# Patient Record
(demographics unavailable — no encounter records)

---

## 2024-11-18 NOTE — PROCEDURE
[FreeTextEntry1] : Patient informed that Rigid Proctoscopy involves the insertion of a slender tube, known as a proctoscope, into the rectum, advanced above the anal canal, to directly examine the rectal lining. Proctoscopy enables the assessment and detection of conditions such as inflammation, polyps, tumors or other sources of bleeding and symptoms which may be located in the lower portion of the rectum. Verbal /written consent obtained with patient prior to examination.  With patient positioned left lateral decubitus, a lubricated rigid proctoscope is inserted into the anorectum and advanced to a distance of approximately 15 cm. Visualization of the rectal mucosa performed with limitations due to pt discomfort and/or presence of stool in the rectal vault. Procedure completed without known complication.  Results and findings discussed with pt with further treatment recommendations as appropriate.

## 2024-11-18 NOTE — HISTORY OF PRESENT ILLNESS
[FreeTextEntry1] : Josefa Pacheco is a very pleasant 75-year-old female, referred by Dr. Murillo, with a newly diagnosed rectal mass.  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  This has resulted in regular colonoscopic treatment evaluation.  She reports her last colonoscopy was approximately 3 years ago.  With the exam performed November 15, 2024 a 2.5 cm low rectal mass is diagnosed and biopsied.  At the time of my meeting with Josefa today the biopsy report is still pending.  Josefa reports over the past few months a feeling of tenesmus, anorectal pelvic floor achy pain which she has needed Tylenol for management.  She also reports the appearance of bright red blood per rectum with bowel movements over these past several weeks/2 to 3 months.  She denies any unintended weight loss.  Recent blood work from September does not demonstrate anemia.

## 2024-11-18 NOTE — PHYSICAL EXAM
[FreeTextEntry1] : Anorectal physical examination includes visual, digital rectal exam, proctoscopic exam In the right lateral/right posterior quadrant, just at the top of the anal sphincter muscle complex there is a palpable 2 to 3 cm mass consistent with the colonoscopy findings.

## 2024-11-18 NOTE — ASSESSMENT
[FreeTextEntry1] : 75-year-old female in otherwise good health, with a newly diagnosed rectal mass.  Clinically the mass is consistent with a malignancy.  While we await the histology report from the colonoscopic biopsies, we will move forward with additional diagnostic evaluation and appropriate consultation.  I discussed with Josefa the need for CAT scan and MRI evaluation.  Additionally consultation with medical oncology and radiation oncology.  I reviewed with her the potential recommendation for a AQUILES (Total Neoadjuvant tTherapy) program, with the goals of cancer cure and anal sphincter muscle preservation.  With the mass currently located just above the anal sphincter muscle complex, sphincter preservation is anticipated but not yet guaranteed.  With everything that needs to be done, Josefa is grateful that we will enroll her in the Cancer Navigation program so that all of the logistics of ongoing evaluation and treatment are comanaged.

## 2024-11-18 NOTE — CONSULT LETTER
[Dear  ___] : Dear  [unfilled], [Consult Letter:] : I had the pleasure of evaluating your patient, [unfilled]. [FreeTextEntry2] : MD Kd Barnes MD [FreeTextEntry1] : It was a pleasure to meet Josefa Pacheco today, following colonoscopic diagnosis of a rectal mass suspicious for malignancy, last Friday, November 15.   We have initiated CAT scan and MRI evaluation with referrals for [facility] and RadOnc consultation.  I will stay in touch Again many thanks

## 2024-11-27 NOTE — REASON FOR VISIT
[Consideration of Curative Therapy] : consideration of curative therapy for [Rectal Cancer] : cancer [Initial Consultation] : an initial consultation [FreeTextEntry2] : rectal mass

## 2024-11-27 NOTE — HISTORY OF PRESENT ILLNESS
[FreeTextEntry1] : Ms. Pacheco is a 75-year-old female with a newly diagnosed rectal mass.  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 This has resulted in regular colonoscopic treatment evaluation. She reports her last colonoscopy was approximately 3 years ago. With the exam performed November 15, 2024 a 2.5 cm low rectal mass is diagnosed and biopsied.  Josefa reports over the past few months a feeling of tenesmus, anorectal pelvic floor achy pain which she has needed Tylenol for management. She also reports the appearance of bright red blood per rectum with bowel movements over these past several weeks/2 to 3 months. She denies any unintended weight loss. Recent blood work from September does not demonstrate anemia.  Pt will be seeing Dr. Plaza for rad onc consultation   11/15/24 Colonoscopy with Dr. Murillo.   Report: - Polyp (0.5 cm) in the cecum and appendiceal orifice. (Polypectomy).  - Soft tissue mass 2.5CM, friabile Just w/i the rectum in the distal rectum. (Biopsy).  - Grade/Stage II internal hemorrhoids.  - Normal mucosa in the ascending colon, transverse colon, descending colon, sigmoid colon and rectum compatible with microscopic colitis. (Biopsy)   Pathology:  FINAL DIAGNOSIS  A. CECUM, POLYP, BIOPSY  - Tubular adenoma  B. RANDOM ASCENDING COLON, BIOPSY  - Colonic mucosa with no significant pathologic abnormality  C. RANDOM TRANSVERSE COLON, BIOPSY  - Colonic mucosa with no significant pathologic abnormality  D. RANDOM DESCENDING COLON, BIOPSY  - Colonic mucosa with no significant pathologic abnormality  E. RANDOM SIGMOID COLON, BIOPSY  - Colonic mucosa with no pathologic abnormality  F. RECTUM, MASS, BIOPSY  - Rectal mucosa, partially denuded with ischemic changes, focal crypt rupture, and an increase in lamina propria fibrosis, suggestive of mucosal prolapse-type change  - Significant inflammation is not seen  - No dysplasia is seen  - Deeper levels were examined  - See comment    DIAGNOSIS COMMENT:  - No evidence of malignancy is seen in part F. Features suggestive of prolapse-type change/solitary rectal ulcer syndrome are seen. However, it cannot be entirely excluded that this biopsy is not entirely representative of the lesion or that a deeper submucosal lesion is present which is not captured in this biopsy. Clinical and colonoscopic correlation are recommended.  - No significant inflammation, dysplasia, or granulomas are seen in the random colon biopsies.  - This case was reviewed intradepartmentally with a gastrointestinal subspecialty pathologist with agreement.     Family cancer hx:  Family history of Alive and well : Son, Sister Family history of  : Mother, Father, Brother Family history of cardiac disorder (V17.49) (Z82.49) : Brother Family history of lung cancer (V16.1) (Z80.1) : Mother Family history of malignant neoplasm of colon (V16.0) (Z80.0) : Brother Family history of thyroid disease (V18.19) (Z83.49) : Sister Family history of Myocardial infarction (lateral wall) : Father Unknown family medical history : Multiple Family Members  Alcohol use (V49.89) (Z78.9) Exercise frequency (times/week) Home Never a smoker No illicit drug use Retired from employment  Current Meds Levothyroxine Sodium 75 MCG Oral Tablet; TAKE 1 TABLET BY MOUTH EVERY DAY Mercaptopurine 50 MG Oral Tablet; TAKE 2 TABLETS BY MOUTH EVERY DAY Mupirocin 2 % External Ointment Restasis 0.05 % Ophthalmic Emulsion; 1 drop both eyes Ophthalmic Twice a day Triamcinolone Acetonide 0.1 % External Cream  Health screenings: PAP smear Colonoscopy Mammogram Prostate    [0 - No Distress] : Distress Level: 0 [ECOG Performance Status: 0 - Fully active, able to carry on all pre-disease performance without restriction] : Performance Status: 0 - Fully active, able to carry on all pre-disease performance without restriction [de-identified] : Ms. Pacheco is a 75-year-old female with a newly diagnosed rectal mass.  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 This has resulted in regular colonoscopic treatment evaluation. She reports her last colonoscopy was approximately 3 years ago. With the exam performed November 15, 2024 a 2.5 cm low rectal mass is diagnosed and biopsied. Pt reports over the past few months a feeling of tenesmus, anorectal pelvic floor achy pain which she has needed Tylenol for management. She also reports the appearance of bright red blood per rectum with bowel movements over these past several weeks/2 to 3 months. She denies any unintended weight loss.  Pt had a repeat bx yesterday, and is reporting some scant BRBPR w BM this AM.  Pt will be seeing Dr. Plaza for rad onc consultation later today. Pt also has CT CAP scheduled for Monday.   11/15/24 Colonoscopy with Dr. Murillo.   Report: - Polyp (0.5 cm) in the cecum and appendiceal orifice. (Polypectomy).  - Soft tissue mass 2.5CM, friabile Just w/i the rectum in the distal rectum. (Biopsy).  - Grade/Stage II internal hemorrhoids.  - Normal mucosa in the ascending colon, transverse colon, descending colon, sigmoid colon and rectum compatible with microscopic colitis. (Biopsy)   Pathology:  F. RECTUM, MASS, BIOPSY  - Rectal mucosa, partially denuded with ischemic changes, focal crypt rupture, and an increase in lamina propria fibrosis, suggestive of mucosal prolapse-type change  - Significant inflammation is not seen  - No dysplasia is seen  - Deeper levels were examined       Family cancer hx:  Lung cancer- mother Colon cancer- Pt reports that son Galdino, as a peds ER MD in [place]., and would be the primary [person to make decisions if she loses capacity, but that she expects that her other son, Oliver would also be involved in shared decision-making.,   Social Hx: Alcohol use (V49.89) (Z78.9)- occasssional Never a smoker No illicit drug use Retired from employment as    Current Meds Levothyroxine Sodium 75 MCG Oral Tablet; TAKE 1 TABLET BY MOUTH EVERY DAY Mercaptopurine 50 MG Oral Tablet; TAKE 2 TABLETS BY MOUTH EVERY DAY Mupirocin 2 % External Ointment Restasis 0.05 % Ophthalmic Emulsion; 1 drop both eyes Ophthalmic Twice a day (when remembers) Triamcinolone Acetonide 0.1 % External Cream  Health screenings: PAP smear n/a (pt s/p KING/BSO) Mammogram

## 2024-11-27 NOTE — HISTORY OF PRESENT ILLNESS
[FreeTextEntry1] : Ms. Pacheco is a 75-year-old female with a newly diagnosed rectal mass.  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 This has resulted in regular colonoscopic treatment evaluation. She reports her last colonoscopy was approximately 3 years ago. With the exam performed November 15, 2024 a 2.5 cm low rectal mass is diagnosed and biopsied.  Josefa reports over the past few months a feeling of tenesmus, anorectal pelvic floor achy pain which she has needed Tylenol for management. She also reports the appearance of bright red blood per rectum with bowel movements over these past several weeks/2 to 3 months. She denies any unintended weight loss. Recent blood work from September does not demonstrate anemia.  Pt will be seeing Dr. Plaza for rad onc consultation   11/15/24 Colonoscopy with Dr. Murillo.   Report: - Polyp (0.5 cm) in the cecum and appendiceal orifice. (Polypectomy).  - Soft tissue mass 2.5CM, friabile Just w/i the rectum in the distal rectum. (Biopsy).  - Grade/Stage II internal hemorrhoids.  - Normal mucosa in the ascending colon, transverse colon, descending colon, sigmoid colon and rectum compatible with microscopic colitis. (Biopsy)   Pathology:  FINAL DIAGNOSIS  A. CECUM, POLYP, BIOPSY  - Tubular adenoma  B. RANDOM ASCENDING COLON, BIOPSY  - Colonic mucosa with no significant pathologic abnormality  C. RANDOM TRANSVERSE COLON, BIOPSY  - Colonic mucosa with no significant pathologic abnormality  D. RANDOM DESCENDING COLON, BIOPSY  - Colonic mucosa with no significant pathologic abnormality  E. RANDOM SIGMOID COLON, BIOPSY  - Colonic mucosa with no pathologic abnormality  F. RECTUM, MASS, BIOPSY  - Rectal mucosa, partially denuded with ischemic changes, focal crypt rupture, and an increase in lamina propria fibrosis, suggestive of mucosal prolapse-type change  - Significant inflammation is not seen  - No dysplasia is seen  - Deeper levels were examined  - See comment    DIAGNOSIS COMMENT:  - No evidence of malignancy is seen in part F. Features suggestive of prolapse-type change/solitary rectal ulcer syndrome are seen. However, it cannot be entirely excluded that this biopsy is not entirely representative of the lesion or that a deeper submucosal lesion is present which is not captured in this biopsy. Clinical and colonoscopic correlation are recommended.  - No significant inflammation, dysplasia, or granulomas are seen in the random colon biopsies.  - This case was reviewed intradepartmentally with a gastrointestinal subspecialty pathologist with agreement.     Family cancer hx:  Family history of Alive and well : Son, Sister Family history of  : Mother, Father, Brother Family history of cardiac disorder (V17.49) (Z82.49) : Brother Family history of lung cancer (V16.1) (Z80.1) : Mother Family history of malignant neoplasm of colon (V16.0) (Z80.0) : Brother Family history of thyroid disease (V18.19) (Z83.49) : Sister Family history of Myocardial infarction (lateral wall) : Father Unknown family medical history : Multiple Family Members  Alcohol use (V49.89) (Z78.9) Exercise frequency (times/week) Home Never a smoker No illicit drug use Retired from employment  Current Meds Levothyroxine Sodium 75 MCG Oral Tablet; TAKE 1 TABLET BY MOUTH EVERY DAY Mercaptopurine 50 MG Oral Tablet; TAKE 2 TABLETS BY MOUTH EVERY DAY Mupirocin 2 % External Ointment Restasis 0.05 % Ophthalmic Emulsion; 1 drop both eyes Ophthalmic Twice a day Triamcinolone Acetonide 0.1 % External Cream  Health screenings: PAP smear Colonoscopy Mammogram Prostate    [0 - No Distress] : Distress Level: 0 [ECOG Performance Status: 0 - Fully active, able to carry on all pre-disease performance without restriction] : Performance Status: 0 - Fully active, able to carry on all pre-disease performance without restriction [de-identified] : Ms. Pacheco is a 75-year-old female with a newly diagnosed rectal mass.  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 This has resulted in regular colonoscopic treatment evaluation. She reports her last colonoscopy was approximately 3 years ago. With the exam performed November 15, 2024 a 2.5 cm low rectal mass is diagnosed and biopsied. Pt reports over the past few months a feeling of tenesmus, anorectal pelvic floor achy pain which she has needed Tylenol for management. She also reports the appearance of bright red blood per rectum with bowel movements over these past several weeks/2 to 3 months. She denies any unintended weight loss.  Pt had a repeat bx yesterday, and is reporting some scant BRBPR w BM this AM.  Pt will be seeing Dr. Plaza for rad onc consultation later today. Pt also has CT CAP scheduled for Monday.   11/15/24 Colonoscopy with Dr. Murillo.   Report: - Polyp (0.5 cm) in the cecum and appendiceal orifice. (Polypectomy).  - Soft tissue mass 2.5CM, friabile Just w/i the rectum in the distal rectum. (Biopsy).  - Grade/Stage II internal hemorrhoids.  - Normal mucosa in the ascending colon, transverse colon, descending colon, sigmoid colon and rectum compatible with microscopic colitis. (Biopsy)   Pathology:  F. RECTUM, MASS, BIOPSY  - Rectal mucosa, partially denuded with ischemic changes, focal crypt rupture, and an increase in lamina propria fibrosis, suggestive of mucosal prolapse-type change  - Significant inflammation is not seen  - No dysplasia is seen  - Deeper levels were examined       Family cancer hx:  Lung cancer- mother Colon cancer- Pt reports that son Galdino, as a peds ER MD in [place]., and would be the primary [person to make decisions if she loses capacity, but that she expects that her other son, Oliver would also be involved in shared decision-making.,   Social Hx: Alcohol use (V49.89) (Z78.9)- occasssional Never a smoker No illicit drug use Retired from employment as    Current Meds Levothyroxine Sodium 75 MCG Oral Tablet; TAKE 1 TABLET BY MOUTH EVERY DAY Mercaptopurine 50 MG Oral Tablet; TAKE 2 TABLETS BY MOUTH EVERY DAY Mupirocin 2 % External Ointment Restasis 0.05 % Ophthalmic Emulsion; 1 drop both eyes Ophthalmic Twice a day (when remembers) Triamcinolone Acetonide 0.1 % External Cream  Health screenings: PAP smear n/a (pt s/p KING/BSO) Mammogram

## 2024-11-27 NOTE — ASSESSMENT
[FreeTextEntry1] : Discussed initial biopsy results which were negative However, given appearance of mass, clinical suspicion is almost certain it is a malignancy Pending repeat biopsy results from yesterday  s/p MRI pelvis 11/25/24 CT C/A/P scheduled for 12/2  Discussed about the role of local therapy including surgery, radiation and systemic therapy We also reviewed the indication for neoadjuvant approach and the meaning of AQUILES for rectal cancer management. This will largely depend on the imaging results.  Will discuss utility of [employer]- ctDNA which is a useful tool for monitoring at next visit   Plan >CBC, CMP, CEA, Ca 19-9 >will follow up MRI and CT results >follow with Rad Onc today >plan for TB discussion next week

## 2024-11-27 NOTE — VITALS
[90: Able to carry normal activity; minor signs or symptoms of disease.] : 90: Able to carry normal activity; minor signs or symptoms of disease.  [Maximal Pain Intensity: 3/10] : 3/10 [Pain Location: ___] : Pain Location: [unfilled] [OTC] : OTC [Date: ____________] : Patient's last distress assessment performed on [unfilled]. [4 - Distress Level] : Distress Level: 4

## 2024-12-03 NOTE — ADDENDUM
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 Plan to treat as per anal cancer. Staging evaluation revealed T3N0 disease. I discussed with the patient's care team and the patient. She will be scheduled for simulation and treated definitively with chemoradiation as per RTOG 0529. 10.9

## 2024-12-03 NOTE — HISTORY OF PRESENT ILLNESS
[FreeTextEntry1] : Ms. Pacheco is a 75-year-old female with new rectal cancer.  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 This has resulted in regular colonoscopic treatment evaluation. She reports her last colonoscopy was approximately 3 years ago.  Josefa reports over the past few months a feeling of tenesmus, anorectal pelvic floor achy pain which she has needed Tylenol for management. She also reports the appearance of bright red blood per rectum with bowel movements over these past several weeks/2 to 3 months. She denies any unintended weight loss. Recent blood work from September does not demonstrate anemia.  11/15/24 Colonoscopy with Dr. Murillo.   Report: - Polyp (0.5 cm) in the cecum and appendiceal orifice. (Polypectomy).  - Soft tissue mass 2.5CM, friabile Just w/i the rectum in the distal rectum. (Biopsy).  - Grade/Stage II internal hemorrhoids.  - Normal mucosa in the ascending colon, transverse colon, descending colon, sigmoid colon and rectum compatible with microscopic colitis. (Biopsy)   Pathology:  FINAL DIAGNOSIS  A. CECUM, POLYP, BIOPSY  - Tubular adenoma  B. RANDOM ASCENDING COLON, BIOPSY  - Colonic mucosa with no significant pathologic abnormality  C. RANDOM TRANSVERSE COLON, BIOPSY  - Colonic mucosa with no significant pathologic abnormality  D. RANDOM DESCENDING COLON, BIOPSY  - Colonic mucosa with no significant pathologic abnormality  E. RANDOM SIGMOID COLON, BIOPSY  - Colonic mucosa with no pathologic abnormality  F. RECTUM, MASS, BIOPSY  - Rectal mucosa, partially denuded with ischemic changes, focal crypt rupture, and an increase in lamina propria fibrosis, suggestive of mucosal prolapse-type change  - Significant inflammation is not seen  - No dysplasia is seen  - Deeper levels were examined  - See comment    DIAGNOSIS COMMENT:  - No evidence of malignancy is seen in part F. Features suggestive of prolapse-type change/solitary rectal ulcer syndrome are seen. However, it cannot be entirely excluded that this biopsy is not entirely representative of the lesion or that a deeper submucosal lesion is present which is not captured in this biopsy. Clinical and colonoscopic correlation are recommended.  - No significant inflammation, dysplasia, or granulomas are seen in the random colon biopsies.  - This case was reviewed intradepartmentally with a gastrointestinal subspecialty pathologist with agreement.  11/25/24 MRI Pelvis   11/26/24 repeat biopsy with Dr. Irving  11/27/24 Patient presents today for consultation for radiation.

## 2024-12-03 NOTE — PHYSICAL EXAM
[] : no respiratory distress [Exaggerated Use Of Accessory Muscles For Inspiration] : no accessory muscle use [Respiration, Rhythm And Depth] : normal respiratory rhythm and effort [Abdomen Soft] : soft [Nondistended] : nondistended [Abdomen Tenderness] : non-tender [Normal External Genitalia] : normal external genitalia  [Normal] : oriented to person, place and time, the affect was normal, the mood was normal and not anxious [FreeTextEntry1] : Exam performed with JOSE Espinal present as chaperone; there is a bulky but non-obstructing rectal mass located on the posterior rectal wall about 3cm from the anal verge, the proximal extent of the tumor could not be reached; small external hemorrhoid [de-identified] : there is a small mobile, shotty palpable right inguinal node

## 2024-12-03 NOTE — ADDENDUM
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 Plan to treat as per anal cancer. Staging evaluation revealed T3N0 disease. I discussed with the patient's care team and the patient. She will be scheduled for simulation and treated definitively with chemoradiation as per RTOG 0529.

## 2024-12-03 NOTE — DISEASE MANAGEMENT
[Clinical] : TNM Stage: c [N/A] : Currently not applicable [FreeTextEntry4] : TBD - undergoing staging evaluation [TTNM] : - [NTNM] : - [MTNM] : -

## 2024-12-03 NOTE — PHYSICAL EXAM
[] : no respiratory distress [Exaggerated Use Of Accessory Muscles For Inspiration] : no accessory muscle use [Respiration, Rhythm And Depth] : normal respiratory rhythm and effort [Abdomen Soft] : soft [Nondistended] : nondistended [Abdomen Tenderness] : non-tender [Normal External Genitalia] : normal external genitalia  [Normal] : oriented to person, place and time, the affect was normal, the mood was normal and not anxious [FreeTextEntry1] : Exam performed with JOSE Espinal present as chaperone; there is a bulky but non-obstructing rectal mass located on the posterior rectal wall about 3cm from the anal verge, the proximal extent of the tumor could not be reached; small external hemorrhoid [de-identified] : there is a small mobile, shotty palpable right inguinal node

## 2024-12-03 NOTE — PHYSICAL EXAM
[] : no respiratory distress [Respiration, Rhythm And Depth] : normal respiratory rhythm and effort [Exaggerated Use Of Accessory Muscles For Inspiration] : no accessory muscle use [Abdomen Soft] : soft [Nondistended] : nondistended [Abdomen Tenderness] : non-tender [Normal External Genitalia] : normal external genitalia  [Normal] : oriented to person, place and time, the affect was normal, the mood was normal and not anxious [FreeTextEntry1] : Exam performed with JOSE Espinal present as chaperone; there is a bulky but non-obstructing rectal mass located on the posterior rectal wall about 3cm from the anal verge, the proximal extent of the tumor could not be reached; small external hemorrhoid [de-identified] : there is a small mobile, shotty palpable right inguinal node

## 2024-12-05 NOTE — REVIEW OF SYSTEMS
Addressed at facility    Thanks!   [Diarrhea: Grade 0] : Diarrhea: Grade 0 [Negative] : Allergic/Immunologic

## 2024-12-11 NOTE — HISTORY OF PRESENT ILLNESS
[0 - No Distress] : Distress Level: 0 [ECOG Performance Status: 0 - Fully active, able to carry on all pre-disease performance without restriction] : Performance Status: 0 - Fully active, able to carry on all pre-disease performance without restriction [de-identified] : Ms. Pacheco is a 75-year-old female with a newly diagnosed rectal mass.  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 This has resulted in regular colonoscopic treatment evaluation. She reports her last colonoscopy was approximately 3 years ago. With the exam performed November 15, 2024 a 2.5 cm low rectal mass is diagnosed and biopsied. Pt reports over the past few months a feeling of tenesmus, anorectal pelvic floor achy pain which she has needed Tylenol for management. She also reports the appearance of bright red blood per rectum with bowel movements over these past several weeks/2 to 3 months. She denies any unintended weight loss.  Pt had a repeat bx yesterday, and is reporting some scant BRBPR w BM this AM.  Pt will be seeing Dr. Plaza for rad onc consultation later today. Pt also has CT CAP scheduled for Monday.   11/15/24 Colonoscopy with Dr. Murillo.   Report: - Polyp (0.5 cm) in the cecum and appendiceal orifice. (Polypectomy).  - Soft tissue mass 2.5CM, friabile Just w/i the rectum in the distal rectum. (Biopsy).  - Grade/Stage II internal hemorrhoids.  - Normal mucosa in the ascending colon, transverse colon, descending colon, sigmoid colon and rectum compatible with microscopic colitis. (Biopsy)   Pathology:  F. RECTUM, MASS, BIOPSY  - Rectal mucosa, partially denuded with ischemic changes, focal crypt rupture, and an increase in lamina propria fibrosis, suggestive of mucosal prolapse-type change  - Significant inflammation is not seen  - No dysplasia is seen  - Deeper levels were examined       Family cancer hx:  Lung cancer- mother Colon cancer- Pt reports that son Galdino, as a peds ER MD in [place]., and would be the primary [person to make decisions if she loses capacity, but that she expects that her other son, Oliver would also be involved in shared decision-making.,   Social Hx: Alcohol use (V49.89) (Z78.9)- occasssional Never a smoker No illicit drug use Retired from employment as    Current Meds Levothyroxine Sodium 75 MCG Oral Tablet; TAKE 1 TABLET BY MOUTH EVERY DAY Mercaptopurine 50 MG Oral Tablet; TAKE 2 TABLETS BY MOUTH EVERY DAY Mupirocin 2 % External Ointment Restasis 0.05 % Ophthalmic Emulsion; 1 drop both eyes Ophthalmic Twice a day (when remembers) Triamcinolone Acetonide 0.1 % External Cream  Health screenings: PAP smear n/a (pt s/p KING/BSO) Mammogram     [de-identified] : here for follow up s/p Simulation this morning. Plan to start RT 12/16 Has some rectal pain controlled with tylenol No other complaints.

## 2024-12-11 NOTE — REASON FOR VISIT
[Initial Consultation] : an initial consultation [Follow-Up Visit] : a follow-up [FreeTextEntry2] : rectal mass

## 2024-12-11 NOTE — ASSESSMENT
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 No EMVI. 3.9 cm CT C/A/P scheduled for 12/2- no metastatic disease. Small indeterminate liver lesion. will continue to monitor Case presented in TB Discussed utility of Signatera- ctDNA which is a useful tool for monitoring. Signed up for [employer]ater[employer] today   Reviewed treatment regimen with patient. Plan for Mitomycin/Xeloda Mitomycin 10 mg/m2 Day 1 and 29.  Xeloda daily with radiation: 825 mg/m2 BID on days of radiation  Plan >CBC, CMP, CEA, Ca 19-9 >signatera >plan to start treatment 12/16 >Xeloda 1300 mg BID sent to [facility]

## 2024-12-11 NOTE — HISTORY OF PRESENT ILLNESS
[0 - No Distress] : Distress Level: 0 [ECOG Performance Status: 0 - Fully active, able to carry on all pre-disease performance without restriction] : Performance Status: 0 - Fully active, able to carry on all pre-disease performance without restriction [de-identified] : Ms. Pacheco is a 75-year-old female with a newly diagnosed rectal mass.  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 This has resulted in regular colonoscopic treatment evaluation. She reports her last colonoscopy was approximately 3 years ago. With the exam performed November 15, 2024 a 2.5 cm low rectal mass is diagnosed and biopsied. Pt reports over the past few months a feeling of tenesmus, anorectal pelvic floor achy pain which she has needed Tylenol for management. She also reports the appearance of bright red blood per rectum with bowel movements over these past several weeks/2 to 3 months. She denies any unintended weight loss.  Pt had a repeat bx yesterday, and is reporting some scant BRBPR w BM this AM.  Pt will be seeing Dr. Plaza for rad onc consultation later today. Pt also has CT CAP scheduled for Monday.   11/15/24 Colonoscopy with Dr. Murillo.   Report: - Polyp (0.5 cm) in the cecum and appendiceal orifice. (Polypectomy).  - Soft tissue mass 2.5CM, friabile Just w/i the rectum in the distal rectum. (Biopsy).  - Grade/Stage II internal hemorrhoids.  - Normal mucosa in the ascending colon, transverse colon, descending colon, sigmoid colon and rectum compatible with microscopic colitis. (Biopsy)   Pathology:  F. RECTUM, MASS, BIOPSY  - Rectal mucosa, partially denuded with ischemic changes, focal crypt rupture, and an increase in lamina propria fibrosis, suggestive of mucosal prolapse-type change  - Significant inflammation is not seen  - No dysplasia is seen  - Deeper levels were examined       Family cancer hx:  Lung cancer- mother Colon cancer- Pt reports that son Galdino, as a peds ER MD in [place]., and would be the primary [person to make decisions if she loses capacity, but that she expects that her other son, Oliver would also be involved in shared decision-making.,   Social Hx: Alcohol use (V49.89) (Z78.9)- occasssional Never a smoker No illicit drug use Retired from employment as    Current Meds Levothyroxine Sodium 75 MCG Oral Tablet; TAKE 1 TABLET BY MOUTH EVERY DAY Mercaptopurine 50 MG Oral Tablet; TAKE 2 TABLETS BY MOUTH EVERY DAY Mupirocin 2 % External Ointment Restasis 0.05 % Ophthalmic Emulsion; 1 drop both eyes Ophthalmic Twice a day (when remembers) Triamcinolone Acetonide 0.1 % External Cream  Health screenings: PAP smear n/a (pt s/p KING/BSO) Mammogram     [de-identified] : here for follow up s/p Simulation this morning. Plan to start RT 12/16 Has some rectal pain controlled with tylenol No other complaints.

## 2024-12-11 NOTE — HISTORY OF PRESENT ILLNESS
[0 - No Distress] : Distress Level: 0 [ECOG Performance Status: 0 - Fully active, able to carry on all pre-disease performance without restriction] : Performance Status: 0 - Fully active, able to carry on all pre-disease performance without restriction [de-identified] : Ms. Pacheco is a 75-year-old female with a newly diagnosed rectal mass.  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 This has resulted in regular colonoscopic treatment evaluation. She reports her last colonoscopy was approximately 3 years ago. With the exam performed November 15, 2024 a 2.5 cm low rectal mass is diagnosed and biopsied. Pt reports over the past few months a feeling of tenesmus, anorectal pelvic floor achy pain which she has needed Tylenol for management. She also reports the appearance of bright red blood per rectum with bowel movements over these past several weeks/2 to 3 months. She denies any unintended weight loss.  Pt had a repeat bx yesterday, and is reporting some scant BRBPR w BM this AM.  Pt will be seeing Dr. Plaza for rad onc consultation later today. Pt also has CT CAP scheduled for Monday.   11/15/24 Colonoscopy with Dr. Murillo.   Report: - Polyp (0.5 cm) in the cecum and appendiceal orifice. (Polypectomy).  - Soft tissue mass 2.5CM, friabile Just w/i the rectum in the distal rectum. (Biopsy).  - Grade/Stage II internal hemorrhoids.  - Normal mucosa in the ascending colon, transverse colon, descending colon, sigmoid colon and rectum compatible with microscopic colitis. (Biopsy)   Pathology:  F. RECTUM, MASS, BIOPSY  - Rectal mucosa, partially denuded with ischemic changes, focal crypt rupture, and an increase in lamina propria fibrosis, suggestive of mucosal prolapse-type change  - Significant inflammation is not seen  - No dysplasia is seen  - Deeper levels were examined       Family cancer hx:  Lung cancer- mother Colon cancer- Pt reports that son Galdino, as a peds ER MD in [place]., and would be the primary [person to make decisions if she loses capacity, but that she expects that her other son, Oliver would also be involved in shared decision-making.,   Social Hx: Alcohol use (V49.89) (Z78.9)- occasssional Never a smoker No illicit drug use Retired from employment as    Current Meds Levothyroxine Sodium 75 MCG Oral Tablet; TAKE 1 TABLET BY MOUTH EVERY DAY Mercaptopurine 50 MG Oral Tablet; TAKE 2 TABLETS BY MOUTH EVERY DAY Mupirocin 2 % External Ointment Restasis 0.05 % Ophthalmic Emulsion; 1 drop both eyes Ophthalmic Twice a day (when remembers) Triamcinolone Acetonide 0.1 % External Cream  Health screenings: PAP smear n/a (pt s/p KING/BSO) Mammogram     [de-identified] : here for follow up s/p Simulation this morning. Plan to start RT 12/16 Has some rectal pain controlled with tylenol No other complaints.

## 2024-12-11 NOTE — HISTORY OF PRESENT ILLNESS
[0 - No Distress] : Distress Level: 0 [ECOG Performance Status: 0 - Fully active, able to carry on all pre-disease performance without restriction] : Performance Status: 0 - Fully active, able to carry on all pre-disease performance without restriction [de-identified] : Ms. Pacheco is a 75-year-old female with a newly diagnosed rectal mass.  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 This has resulted in regular colonoscopic treatment evaluation. She reports her last colonoscopy was approximately 3 years ago. With the exam performed November 15, 2024 a 2.5 cm low rectal mass is diagnosed and biopsied. Pt reports over the past few months a feeling of tenesmus, anorectal pelvic floor achy pain which she has needed Tylenol for management. She also reports the appearance of bright red blood per rectum with bowel movements over these past several weeks/2 to 3 months. She denies any unintended weight loss.  Pt had a repeat bx yesterday, and is reporting some scant BRBPR w BM this AM.  Pt will be seeing Dr. Plaza for rad onc consultation later today. Pt also has CT CAP scheduled for Monday.   11/15/24 Colonoscopy with Dr. Murillo.   Report: - Polyp (0.5 cm) in the cecum and appendiceal orifice. (Polypectomy).  - Soft tissue mass 2.5CM, friabile Just w/i the rectum in the distal rectum. (Biopsy).  - Grade/Stage II internal hemorrhoids.  - Normal mucosa in the ascending colon, transverse colon, descending colon, sigmoid colon and rectum compatible with microscopic colitis. (Biopsy)   Pathology:  F. RECTUM, MASS, BIOPSY  - Rectal mucosa, partially denuded with ischemic changes, focal crypt rupture, and an increase in lamina propria fibrosis, suggestive of mucosal prolapse-type change  - Significant inflammation is not seen  - No dysplasia is seen  - Deeper levels were examined       Family cancer hx:  Lung cancer- mother Colon cancer- Pt reports that son Galdino, as a peds ER MD in [place]., and would be the primary [person to make decisions if she loses capacity, but that she expects that her other son, Oliver would also be involved in shared decision-making.,   Social Hx: Alcohol use (V49.89) (Z78.9)- occasssional Never a smoker No illicit drug use Retired from employment as    Current Meds Levothyroxine Sodium 75 MCG Oral Tablet; TAKE 1 TABLET BY MOUTH EVERY DAY Mercaptopurine 50 MG Oral Tablet; TAKE 2 TABLETS BY MOUTH EVERY DAY Mupirocin 2 % External Ointment Restasis 0.05 % Ophthalmic Emulsion; 1 drop both eyes Ophthalmic Twice a day (when remembers) Triamcinolone Acetonide 0.1 % External Cream  Health screenings: PAP smear n/a (pt s/p KING/BSO) Mammogram     [de-identified] : here for follow up s/p Simulation this morning. Plan to start RT 12/16 Has some rectal pain controlled with tylenol No other complaints.

## 2024-12-13 NOTE — REVIEW OF SYSTEMS
Date of Service:  6/29/2023    Chief Complaint:  BPH follow-up    Independent Historian:  No    History of Present Illness:    Mark Jenkins is a 79 year old male presenting for transfer of care from Dr. Wolfe.  Patient has history of partial nephrectomy in 2016 for multi-cystic dysplastic right kidney.  Last CT September 19, 2022 with 2 mm right nonobstructing stone and well-healed right kidney.  Patient takes Flomax and finasteride for baseline nocturia, frequency and urgency.  He is noticing some increased frequency and weakening at the end of his stream.  Symptoms are worse with coffee.  Last PSA 0.02 on June 5, 2023.  PVR 33 cc, UA with trace blood     PSA Test Results:  Lab Results   Component Value Date    PSA 0.02 06/05/2023    PSA 0.02 10/25/2022    PSA 0.10 10/18/2021    PSA 0.12 09/24/2020    PSA 0.49 10/11/2019    PSA 0.17 04/19/2018    PSA 0.62 10/05/2017    PSA 0.17 11/29/2016    PSA 0.23 10/24/2016    PSA 0.19 04/28/2016       Past Medical History:   Past Medical History:   Diagnosis Date   • Adenoma of right adrenal gland 2018   • Allergy 1995   • Arthritis    • Bronchial asthma    • Bronchitis    • Cataract 2015    Removed   • Chronic obstructive pulmonary disease (CMD) 07/10/2014   • Chronic pain    • Diverticulitis    • Fracture    • Gynecomastia, male     Bilateral   • Hemorrhoids    • HTN (hypertension)    • Hyperlipidemia    • Macular degeneration, bilateral    • Prostatic hypertrophy    • S/p robotic right thoracoscopy (VATS) lower lobe basilar segmentectomy with lymph node dissection 5/10/2023   • Transfusion history     With THR  (own blood)   • Vertigo    • Wears glasses    • Wears partial dentures     upper removable bridge       Surgical History:  Past Surgical History:   Procedure Laterality Date   • Appendectomy  age 25   • Back surgery  1998    Crushed disc   • Breast surgery Bilateral 2014    At [facility]   Reduction   • Carpal tunnel release Right 05/20/2008    Carpal Tunnel   •  Colonoscopy w/ biopsies and polypectomy  2010   • Cyst removal Right     large kidney cyst removed   • Eye surgery Bilateral     Cataract Extraction with IOL   • Fracture surgery      pt denies   • Hernia repair  as infant    as b   • Joint replacement Left     THR   • Lung removal, partial Right 05/10/2023    S/p robotic right thoracoscopy (VATS) lower lobe basilar segmentectomy with lymph node dissection   • Sacral nerve stimulator placement     • Xray mammogram diagnostic bilat  09/15/2011   :    Family History:  Family History   Problem Relation Age of Onset   • Hypertension Mother    • Heart disease Mother    • Stroke Father    • Cancer Father 59        lung cancer and diagose stomach cancer at 62   • Hypertension Brother    • Heart disease Brother    • Early death Brother    • Coronary Artery Disease Brother    • Osteoarthritis Brother    • Diabetes Neg Hx        Social History:  Social History     Socioeconomic History   • Marital status: /Civil Union     Spouse name: Not on file   • Number of children: Not on file   • Years of education: Not on file   • Highest education level: Not on file   Occupational History   • Occupation: retired office work   Tobacco Use   • Smoking status: Former     Current packs/day: 0.00     Average packs/day: 0.5 packs/day for 32.1 years (16.1 pk-yrs)     Types: Cigarettes     Start date: 1960     Quit date: 1992     Years since quittin.7   • Smokeless tobacco: Never   Vaping Use   • Vaping Use: never used   Substance and Sexual Activity   • Alcohol use: No     Alcohol/week: 0.0 standard drinks of alcohol     Comment: rare; 1 in last 4 weeks;    • Drug use: No   • Sexual activity: Not Currently     Partners: Female     Birth control/protection: Post-menopausal   Other Topics Concern   •  Service Not Asked   • Blood Transfusions Not Asked   • Caffeine Concern Not Asked   • Occupational Exposure Not Asked   • Hobby Hazards Not Asked   •  Sleep Concern Not Asked   • Stress Concern Not Asked   • Weight Concern Not Asked   • Special Diet Not Asked   • Back Care Not Asked   • Exercise Not Asked   • Bike Helmet Not Asked   • Seat Belt Yes   • Self-Exams Not Asked   Social History Narrative    Mark is retired and  and lives with wife. 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 Patient states that he came up from [place] to [place] ([place] for 12 years) and [facility] (13 years) and had previouly lived in [place] (15 years). No pets at home     Social Determinants of Health     Financial Resource Strain: Low Risk  (5/10/2023)    Financial Resource Strain    • Social Determinants: Financial Resource Strain: None   Food Insecurity: No Food Insecurity (5/10/2023)    Food Insecurity    • Social Determinants: Food Insecurity: Never   Transportation Needs: No Transportation Needs (5/10/2023)    PRAPARE - Transportation    • Lack of Transportation (Medical): No    • Lack of Transportation (Non-Medical): No   Physical Activity: Not on file   Stress: Not on file   Social Connections: Socially Integrated (5/10/2023)    Social Connections    • Social Determinants: Social Connections: 5 or more times a week   Intimate Partner Violence: Not At Risk (5/10/2023)    Intimate Partner Violence    • Social Determinants: Intimate Partner Violence Past Fear: No    • Social Determinants: Intimate Partner Violence Current Fear: No       Allergies:  ALLERGIES:   Allergen Reactions   • Penicillins ANAPHYLAXIS   • Morphine RASH and PRURITUS   • Bee Sting HIVES     Ant bite is the correct agent that causes itching and trouble breathing       Medications:  Current Outpatient Medications   Medication Sig Dispense Refill   • tadalafil (CIALIS) 5 MG tablet Take 1 tablet by mouth daily. 30 tablet 3   • tamsulosin (FLOMAX) 0.4 MG Cap Take 1 capsule by mouth daily after a meal. 90 capsule 3   • carbamide peroxide (DEBROX) 6.5 % otic solution Place 5 drops into  right ear in the morning and 5 drops in the evening. 15 mL 0   • acetaminophen (TYLENOL) 500 MG tablet Take 500-1,000 mg by mouth every 6 hours as needed for Fever or Pain (mild to moderate pain). Indications: Fever, Pain, mild to moderate pain     • docusate sodium-sennosides (SENOKOT S) 50-8.6 MG per tablet Take 1 tablet by mouth 2 times daily as needed for Constipation. 15 tablet 0   • GuaiFENesin 1200 MG 12 hr tablet Take 1 tablet by mouth 2 times daily as needed for Congestion. 30 tablet 0   • tiZANidine (ZANAFLEX) 2 MG tablet Take 1 tablet by mouth every 8 hours as needed for Muscle spasms. 20 tablet 0   • losartan (COZAAR) 100 MG tablet TAKE 1 TABLET BY MOUTH EVERY DAY 90 tablet 1   • mupirocin (BACTROBAN) 2 % ointment Apply a thin layer to sores on scalp and forehead twice daily for 1 week 22 g 2   • aspirin 81 MG chewable tablet Chew 1 tablet by mouth daily.     • ketoconazole (NIZORAL) 2 % shampoo WASH FACE AND TRUNK 3 TIMES WEEKLY 120 mL 11   • ketoconazole (NIZORAL) 2 % cream Apply a thin layer to rash on the face once daily as needed 30 g 11   • hydroCORTisone (CORTIZONE) 2.5 % cream Apply 1 application. topically 2 times daily. (Patient taking differently: Apply 1 application. topically 2 times daily as needed for Itching or Rash.) 28 g 3   • ALPRAZolam (XANAX) 0.5 MG tablet Take 1 hour before mri/pet scan, do not drive. 3 tablet 0   • finasteride (PROSCAR) 5 MG tablet TAKE 1 TABLET BY MOUTH EVERY DAY 90 tablet 3   • atorvastatin (LIPITOR) 10 MG tablet TAKE 1 TABLET BY MOUTH EVERY DAY 90 tablet 1   • hydroCHLOROthiazide (HYDRODIURIL) 12.5 MG tablet TAKE 1 TABLET BY MOUTH EVERY DAY 90 tablet 1   • meclizine (ANTIVERT) 25 MG tablet Take 1 tablet by mouth 3 times daily as needed for Dizziness. 30 tablet 1   • vitamin B-12 (CYANOCOBALAMIN) 1000 MCG tablet Take 1,000 mcg by mouth daily.     • EPINEPHrine 0.3 MG/0.3ML auto-injector INJECT 0.3 MLS INTO THE MUSCLE AS NEEDED (HIVES). 2 each 3   • Polyethylene  Glycol Powder Take 17 g by mouth daily. 527 g 5   • clindamycin (CLEOCIN) 300 MG capsule Take 3 tablets 1 hour before dental work 9 capsule 1   • Multiple Vitamins-Minerals (ICAPS AREDS 2) Cap Take 2 tablets by mouth daily.     • Omega-3 Fatty Acids (FISH OIL) 1200 MG capsule Take 1,200 mg by mouth daily.     • albuterol-ipratropium 2.5 mg/0.5 mg (DUONEB) 0.5-2.5 (3) MG/3ML nebulizer solution Take 3 mLs by nebulization every 6 hours as needed for Wheezing. 120 mL 1   • saccharomyces boulardii (FLORASTOR) 250 MG capsule Take 250 mg by mouth as needed.     • cholecalciferol (VITAMIN D3) 1000 UNITS tablet Take 5,000 Units by mouth daily.       No current facility-administered medications for this visit.     Facility-Administered Medications Ordered in Other Visits   Medication   • sodium chloride 0.9 % flush bag 25 mL   • sodium chloride (PF) 0.9 % injection 2 mL       Allergies, Medications, Past Medical History, Past Surgical History, Family History, and Social History were reviewed today and changes are as noted above.    Physical Exam:  Constitutional:  Visit Vitals  /76   Pulse 64   Resp 16   Ht 6' (1.829 m)   Wt 79.4 kg (175 lb 0.7 oz)   BMI 23.74 kg/m²     Well developed, well nourished, and afebrile.    Eyes:  Conjunctivae and lids are normal.  Pupils and irises are equal and reactive to light.    Gastrointestinal:  Abdomen soft, nontender, nondistended.  No hepatosplenomegaly.  No hernias.    Skin:  Warm and dry with no lesions or induration.      Record Review:    I met with patient, Mark Jenkins, and obtained pertinent history and exam.    Tests Reviewed:  PVR, Urinalysis and PSA  Tests Ordered:  PVR and Urinalysis    The records were reviewed, as above.     Assessment and Plan:    Mark Jenkins is a 79 year old male with history of right partial nephrectomy for multicystic dysplastic kidney, on Flomax and finasteride for obstructive voiding symptoms, with worsening frequency and weakening  terminal stream since follow-up.  Question of microscopic hematuria    1. Will send urine for micro (at the time of note completion, repeat micro is negative)  2. Will transition from Flomax 0.8 mg to 0.4 mg with addition of daily 5 mg Cialis.  Follow-up in 1 month to reassess symptoms  3. Due for renal imaging around February, 2024, appropriate for ultrasound, but CT can also be considered    MDM/Complexity Statement:  2 stable chronic illnesses         [Diarrhea: Grade 0] : Diarrhea: Grade 0 [Negative] : Allergic/Immunologic

## 2024-12-23 NOTE — HISTORY OF PRESENT ILLNESS
[FreeTextEntry1] : 12/23/24 FX 1 Pt did very well with TX.  Today she denies any pain , N/V diarrhea or constipation symptoms.

## 2024-12-23 NOTE — DISEASE MANAGEMENT
[Clinical] : TNM Stage: c [N/A] : Currently not applicable [FreeTextEntry4] : TBD - undergoing staging evaluation [TTNM] : - [NTNM] : - [MTNM] : - [de-identified] : 180 [de-identified] : [id number] [de-identified] : anus/pelvis

## 2024-12-23 NOTE — REVIEW OF SYSTEMS
[Constipation: Grade 0] : Constipation: Grade 0 [Diarrhea: Grade 0] : Diarrhea: Grade 0 [Fecal Incontinence: Grade 0] : Fecal Incontinence: Grade 0 [Nausea: Grade 0] : Nausea: Grade 0 [Vomiting: Grade 0] : Vomiting: Grade 0 [Fatigue: Grade 1 - Fatigue relieved by rest] : Fatigue: Grade 1 - Fatigue relieved by rest [Vaginal Stricture: Grade 0] : Vaginal Stricture: Grade 0 [Dermatitis Radiation: Grade 0] : Dermatitis Radiation: Grade 0

## 2024-12-23 NOTE — HISTORY OF PRESENT ILLNESS
[0 - No Distress] : Distress Level: 0 [ECOG Performance Status: 0 - Fully active, able to carry on all pre-disease performance without restriction] : Performance Status: 0 - Fully active, able to carry on all pre-disease performance without restriction [de-identified] : Ms. Pacheco is a 75-year-old female with a newly diagnosed rectal mass.  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 This has resulted in regular colonoscopic treatment evaluation. She reports her last colonoscopy was approximately 3 years ago. With the exam performed November 15, 2024 a 2.5 cm low rectal mass is diagnosed and biopsied. Pt reports over the past few months a feeling of tenesmus, anorectal pelvic floor achy pain which she has needed Tylenol for management. She also reports the appearance of bright red blood per rectum with bowel movements over these past several weeks/2 to 3 months. She denies any unintended weight loss.  Pt had a repeat bx yesterday, and is reporting some scant BRBPR w BM this AM.  Pt will be seeing Dr. Plaza for rad onc consultation later today. Pt also has CT CAP scheduled for Monday.   11/15/24 Colonoscopy with Dr. Murillo.   Report: - Polyp (0.5 cm) in the cecum and appendiceal orifice. (Polypectomy).  - Soft tissue mass 2.5CM, friabile Just w/i the rectum in the distal rectum. (Biopsy).  - Grade/Stage II internal hemorrhoids.  - Normal mucosa in the ascending colon, transverse colon, descending colon, sigmoid colon and rectum compatible with microscopic colitis. (Biopsy)   Pathology:  F. RECTUM, MASS, BIOPSY  - Rectal mucosa, partially denuded with ischemic changes, focal crypt rupture, and an increase in lamina propria fibrosis, suggestive of mucosal prolapse-type change  - Significant inflammation is not seen  - No dysplasia is seen  - Deeper levels were examined       Family cancer hx:  Lung cancer- mother Colon cancer- Pt reports that son Galdino, as a peds ER MD in [place]., and would be the primary [person to make decisions if she loses capacity, but that she expects that her other son, Oliver would also be involved in shared decision-making.,   Social Hx: Alcohol use (V49.89) (Z78.9)- occasssional Never a smoker No illicit drug use Retired from employment as    Current Meds Levothyroxine Sodium 75 MCG Oral Tablet; TAKE 1 TABLET BY MOUTH EVERY DAY Mercaptopurine 50 MG Oral Tablet; TAKE 2 TABLETS BY MOUTH EVERY DAY Mupirocin 2 % External Ointment Restasis 0.05 % Ophthalmic Emulsion; 1 drop both eyes Ophthalmic Twice a day (when remembers) Triamcinolone Acetonide 0.1 % External Cream  Health screenings: PAP smear n/a (pt s/p KING/BSO) Mammogram     [de-identified] : here for follow up s/p Simulation this morning. Plan to start RT 12/16 Has some rectal pain controlled with tylenol No other complaints.

## 2024-12-23 NOTE — HISTORY OF PRESENT ILLNESS
[0 - No Distress] : Distress Level: 0 [ECOG Performance Status: 0 - Fully active, able to carry on all pre-disease performance without restriction] : Performance Status: 0 - Fully active, able to carry on all pre-disease performance without restriction [de-identified] : Ms. Pacheco is a 75-year-old female with a newly diagnosed rectal mass.  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 This has resulted in regular colonoscopic treatment evaluation. She reports her last colonoscopy was approximately 3 years ago. With the exam performed November 15, 2024 a 2.5 cm low rectal mass is diagnosed and biopsied. Pt reports over the past few months a feeling of tenesmus, anorectal pelvic floor achy pain which she has needed Tylenol for management. She also reports the appearance of bright red blood per rectum with bowel movements over these past several weeks/2 to 3 months. She denies any unintended weight loss.  Pt had a repeat bx yesterday, and is reporting some scant BRBPR w BM this AM.  Pt will be seeing Dr. Plaza for rad onc consultation later today. Pt also has CT CAP scheduled for Monday.   11/15/24 Colonoscopy with Dr. Murillo.   Report: - Polyp (0.5 cm) in the cecum and appendiceal orifice. (Polypectomy).  - Soft tissue mass 2.5CM, friabile Just w/i the rectum in the distal rectum. (Biopsy).  - Grade/Stage II internal hemorrhoids.  - Normal mucosa in the ascending colon, transverse colon, descending colon, sigmoid colon and rectum compatible with microscopic colitis. (Biopsy)   Pathology:  F. RECTUM, MASS, BIOPSY  - Rectal mucosa, partially denuded with ischemic changes, focal crypt rupture, and an increase in lamina propria fibrosis, suggestive of mucosal prolapse-type change  - Significant inflammation is not seen  - No dysplasia is seen  - Deeper levels were examined       Family cancer hx:  Lung cancer- mother Colon cancer- Pt reports that son Galdino, as a peds ER MD in [place]., and would be the primary [person to make decisions if she loses capacity, but that she expects that her other son, Oliver would also be involved in shared decision-making.,   Social Hx: Alcohol use (V49.89) (Z78.9)- occasssional Never a smoker No illicit drug use Retired from employment as    Current Meds Levothyroxine Sodium 75 MCG Oral Tablet; TAKE 1 TABLET BY MOUTH EVERY DAY Mercaptopurine 50 MG Oral Tablet; TAKE 2 TABLETS BY MOUTH EVERY DAY Mupirocin 2 % External Ointment Restasis 0.05 % Ophthalmic Emulsion; 1 drop both eyes Ophthalmic Twice a day (when remembers) Triamcinolone Acetonide 0.1 % External Cream  Health screenings: PAP smear n/a (pt s/p KING/BSO) Mammogram     [de-identified] : here for follow up s/p Simulation this morning. Plan to start RT 12/16 Has some rectal pain controlled with tylenol No other complaints.

## 2024-12-23 NOTE — PHYSICAL EXAM
[] : no respiratory distress [Exaggerated Use Of Accessory Muscles For Inspiration] : no accessory muscle use [Normal] : oriented to person, place and time, the affect was normal, the mood was normal and not anxious [FreeTextEntry1] : Deferred as this is only Fx 1

## 2024-12-23 NOTE — HISTORY OF PRESENT ILLNESS
[0 - No Distress] : Distress Level: 0 [ECOG Performance Status: 0 - Fully active, able to carry on all pre-disease performance without restriction] : Performance Status: 0 - Fully active, able to carry on all pre-disease performance without restriction [de-identified] : Ms. Pacheco is a 75-year-old female with a newly diagnosed rectal mass.  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 This has resulted in regular colonoscopic treatment evaluation. She reports her last colonoscopy was approximately 3 years ago. With the exam performed November 15, 2024 a 2.5 cm low rectal mass is diagnosed and biopsied. Pt reports over the past few months a feeling of tenesmus, anorectal pelvic floor achy pain which she has needed Tylenol for management. She also reports the appearance of bright red blood per rectum with bowel movements over these past several weeks/2 to 3 months. She denies any unintended weight loss.  Pt had a repeat bx yesterday, and is reporting some scant BRBPR w BM this AM.  Pt will be seeing Dr. Plaza for rad onc consultation later today. Pt also has CT CAP scheduled for Monday.   11/15/24 Colonoscopy with Dr. Murillo.   Report: - Polyp (0.5 cm) in the cecum and appendiceal orifice. (Polypectomy).  - Soft tissue mass 2.5CM, friabile Just w/i the rectum in the distal rectum. (Biopsy).  - Grade/Stage II internal hemorrhoids.  - Normal mucosa in the ascending colon, transverse colon, descending colon, sigmoid colon and rectum compatible with microscopic colitis. (Biopsy)   Pathology:  F. RECTUM, MASS, BIOPSY  - Rectal mucosa, partially denuded with ischemic changes, focal crypt rupture, and an increase in lamina propria fibrosis, suggestive of mucosal prolapse-type change  - Significant inflammation is not seen  - No dysplasia is seen  - Deeper levels were examined       Family cancer hx:  Lung cancer- mother Colon cancer- Pt reports that son Galdino, as a peds ER MD in [place]., and would be the primary [person to make decisions if she loses capacity, but that she expects that her other son, Oliver would also be involved in shared decision-making.,   Social Hx: Alcohol use (V49.89) (Z78.9)- occasssional Never a smoker No illicit drug use Retired from employment as    Current Meds Levothyroxine Sodium 75 MCG Oral Tablet; TAKE 1 TABLET BY MOUTH EVERY DAY Mercaptopurine 50 MG Oral Tablet; TAKE 2 TABLETS BY MOUTH EVERY DAY Mupirocin 2 % External Ointment Restasis 0.05 % Ophthalmic Emulsion; 1 drop both eyes Ophthalmic Twice a day (when remembers) Triamcinolone Acetonide 0.1 % External Cream  Health screenings: PAP smear n/a (pt s/p KING/BSO) Mammogram     [de-identified] : here for follow up s/p Simulation this morning. Plan to start RT 12/16 Has some rectal pain controlled with tylenol No other complaints.

## 2025-01-03 NOTE — DISEASE MANAGEMENT
[Clinical] : TNM Stage: c [N/A] : Currently not applicable [FreeTextEntry4] : TBD - undergoing staging evaluation [TTNM] : - [NTNM] : - [MTNM] : - [de-identified] : 1080 [de-identified] : 5902 [de-identified] : anus/pelvis

## 2025-01-03 NOTE — HISTORY OF PRESENT ILLNESS
[FreeTextEntry1] : 12/23/24 FX 1 Pt did very well with TX.  Today she denies any pain , N/V diarrhea or constipation symptoms.   12/31/24 FX 6.  Had some constipation this week end but it resolved with MiraLAX.  She is doing well now and denies any pain or discomfort passing stool or urine.  Her appetite has been good

## 2025-01-03 NOTE — REVIEW OF SYSTEMS
[Constipation: Grade 1 - Occasional or intermittent symptoms; occasional use of stool softeners, laxatives, dietary modification, or enema] : Constipation: Grade 1 - Occasional or intermittent symptoms; occasional use of stool softeners, laxatives, dietary modification, or enema [Diarrhea: Grade 0] : Diarrhea: Grade 0 [Nausea: Grade 0] : Nausea: Grade 0 [Rectal Pain: Grade 0] : Rectal Pain: Grade 0 [Fatigue: Grade 0] : Fatigue: Grade 0 [Urinary Tract Pain: Grade 0] : Urinary Tract Pain: Grade 0 [Urinary Urgency: Grade 0] : Urinary Urgency: Grade 0 [Urinary Frequency: Grade 0] : Urinary Frequency: Grade 0 [Skin Hyperpigmentation: Grade 0] : Skin Hyperpigmentation: Grade 0 [Dermatitis Radiation: Grade 0] : Dermatitis Radiation: Grade 0

## 2025-01-03 NOTE — DISEASE MANAGEMENT
[Clinical] : TNM Stage: c [N/A] : Currently not applicable [FreeTextEntry4] : TBD - undergoing staging evaluation [TTNM] : - [NTNM] : - [MTNM] : - [de-identified] : 1080 [de-identified] : 9692 [de-identified] : anus/pelvis

## 2025-01-03 NOTE — PHYSICAL EXAM
[] : no respiratory distress [Exaggerated Use Of Accessory Muscles For Inspiration] : no accessory muscle use [Abdomen Soft] : soft [Nondistended] : nondistended [Abdomen Tenderness] : non-tender [Normal] : oriented to person, place and time, the affect was normal, the mood was normal and not anxious [FreeTextEntry1] : Exam performed with LIEN Soto as chaperone; no perianal skin changes

## 2025-01-07 NOTE — VITALS
[Maximal Pain Intensity: 5/10] : 5/10 [Least Pain Intensity: 0/10] : 0/10 [NSAID/Non-Opioid] : NSAID/Non-Opioid [80: Normal activity with effort; some signs or symptoms of disease.] : 80: Normal activity with effort; some signs or symptoms of disease.

## 2025-01-08 NOTE — HISTORY OF PRESENT ILLNESS
[FreeTextEntry1] : 12/23/24 FX 1 Pt did very well with TX.  Today she denies any pain , N/V diarrhea or constipation symptoms.   12/31/24 FX 6.  Had some constipation this week end but it resolved with MiraLAX.  She is doing well now and denies any pain or discomfort passing stool or urine.  Her appetite has been good  1/7/24 FX 10. Doing well with TX  but is starting to have some loose stool 3-4 times per day.  She reports that she is also having discomfort in the groin area and is taking Tylenol for this.  Her appetite reamins good and her weight stable

## 2025-01-08 NOTE — REVIEW OF SYSTEMS
[Anal Pain: Grade 1 - Mild pain] : Anal Pain: Grade 1 - Mild pain [Diarrhea: Grade 1 - Increase of <4 stools per day over baseline; mild increase in ostomy output compared to baseline] : Diarrhea: Grade 1 - Increase of <4 stools per day over baseline; mild increase in ostomy output compared to baseline [Fecal Incontinence: Grade 0] : Fecal Incontinence: Grade 0 [Nausea: Grade 0] : Nausea: Grade 0 [Proctitis: Grade 0] : Proctitis: Grade 0 [Fatigue: Grade 1 - Fatigue relieved by rest] : Fatigue: Grade 1 - Fatigue relieved by rest [Urinary Tract Pain: Grade 0] : Urinary Tract Pain: Grade 0 [Urinary Urgency: Grade 0] : Urinary Urgency: Grade 0 [Urinary Frequency: Grade 0] : Urinary Frequency: Grade 0 [Dermatitis Radiation: Grade 0] : Dermatitis Radiation: Grade 0

## 2025-01-08 NOTE — DISEASE MANAGEMENT
[Clinical] : TNM Stage: c [N/A] : Currently not applicable [FreeTextEntry4] : TBD - undergoing staging evaluation [TTNM] : - [NTNM] : - [MTNM] : - [de-identified] : 4888 [de-identified] : 5439 [de-identified] : anus/pelvis

## 2025-01-08 NOTE — PHYSICAL EXAM
[Outer Ear] : the ears and nose were normal in appearance [] : no respiratory distress [Exaggerated Use Of Accessory Muscles For Inspiration] : no accessory muscle use [Abdomen Soft] : soft [Nondistended] : nondistended [Abdomen Tenderness] : non-tender [Normal External Genitalia] : normal external genitalia  [Normal] : oriented to person, place and time, the affect was normal, the mood was normal and not anxious [FreeTextEntry1] : Exam performed with LIEN Soto as chaperone; no perianal skin changes or desquamation

## 2025-01-08 NOTE — DISEASE MANAGEMENT
[Clinical] : TNM Stage: c [N/A] : Currently not applicable [FreeTextEntry4] : TBD - undergoing staging evaluation [TTNM] : - [NTNM] : - [MTNM] : - [de-identified] : 5101 [de-identified] : 5283 [de-identified] : anus/pelvis

## 2025-01-14 NOTE — ASSESSMENT
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 No EMVI. 3.9 cm CT C/A/P scheduled for 12/2- no metastatic disease. Small indeterminate liver lesion. will continue to monitor Case presented in TB Discussed utility of [employer]- ctDNA which is a useful tool for monitoring. Signed up for [employer] today   Reviewed treatment regimen with patient. Plan for Mitomycin/Xeloda Mitomycin 10 mg/m2 Day 1 and 29.  Xeloda daily with radiation: 825 mg/m2 BID on days of radiation  Plan >CBC, CMP, CEA, Ca 19-9 >signatera >started on 12/16. Now Day 14/30 RT. >c/w Xeloda 1300 mg BID on days of RT >plan for Mitomycin Day 29 on 1/21

## 2025-01-14 NOTE — HISTORY OF PRESENT ILLNESS
[0 - No Distress] : Distress Level: 0 [ECOG Performance Status: 0 - Fully active, able to carry on all pre-disease performance without restriction] : Performance Status: 0 - Fully active, able to carry on all pre-disease performance without restriction [de-identified] : Ms. Pacheco is a 75-year-old female with a newly diagnosed rectal mass.  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 This has resulted in regular colonoscopic treatment evaluation. She reports her last colonoscopy was approximately 3 years ago. With the exam performed November 15, 2024 a 2.5 cm low rectal mass is diagnosed and biopsied. Pt reports over the past few months a feeling of tenesmus, anorectal pelvic floor achy pain which she has needed Tylenol for management. She also reports the appearance of bright red blood per rectum with bowel movements over these past several weeks/2 to 3 months. She denies any unintended weight loss.  Pt had a repeat bx yesterday, and is reporting some scant BRBPR w BM this AM.  Pt will be seeing Dr. Plaza for rad onc consultation later today. Pt also has CT CAP scheduled for Monday.   11/15/24 Colonoscopy with Dr. Murillo.   Report: - Polyp (0.5 cm) in the cecum and appendiceal orifice. (Polypectomy).  - Soft tissue mass 2.5CM, friabile Just w/i the rectum in the distal rectum. (Biopsy).  - Grade/Stage II internal hemorrhoids.  - Normal mucosa in the ascending colon, transverse colon, descending colon, sigmoid colon and rectum compatible with microscopic colitis. (Biopsy)   Pathology:  F. RECTUM, MASS, BIOPSY  - Rectal mucosa, partially denuded with ischemic changes, focal crypt rupture, and an increase in lamina propria fibrosis, suggestive of mucosal prolapse-type change  - Significant inflammation is not seen  - No dysplasia is seen  - Deeper levels were examined       Family cancer hx:  Lung cancer- mother Colon cancer- Pt reports that son Galdino, as a peds ER MD in [place]., and would be the primary [person to make decisions if she loses capacity, but that she expects that her other son, Oliver would also be involved in shared decision-making.,   Social Hx: Alcohol use (V49.89) (Z78.9)- occasssional Never a smoker No illicit drug use Retired from employment as    Current Meds Levothyroxine Sodium 75 MCG Oral Tablet; TAKE 1 TABLET BY MOUTH EVERY DAY Mercaptopurine 50 MG Oral Tablet; TAKE 2 TABLETS BY MOUTH EVERY DAY Mupirocin 2 % External Ointment Restasis 0.05 % Ophthalmic Emulsion; 1 drop both eyes Ophthalmic Twice a day (when remembers) Triamcinolone Acetonide 0.1 % External Cream  Health screenings: PAP smear n/a (pt s/p KING/BSO) Mammogram     [de-identified] : here for follow up 1/13/2025 has had 14 of 30 radiation treatments Continues on xeloda no side effects so far Continues to have a cough Rectal pain is much less, no nausea no diarrhea No other complaints.

## 2025-01-14 NOTE — HISTORY OF PRESENT ILLNESS
[0 - No Distress] : Distress Level: 0 [ECOG Performance Status: 0 - Fully active, able to carry on all pre-disease performance without restriction] : Performance Status: 0 - Fully active, able to carry on all pre-disease performance without restriction [de-identified] : Ms. Pacheco is a 75-year-old female with a newly diagnosed rectal mass.  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 This has resulted in regular colonoscopic treatment evaluation. She reports her last colonoscopy was approximately 3 years ago. With the exam performed November 15, 2024 a 2.5 cm low rectal mass is diagnosed and biopsied. Pt reports over the past few months a feeling of tenesmus, anorectal pelvic floor achy pain which she has needed Tylenol for management. She also reports the appearance of bright red blood per rectum with bowel movements over these past several weeks/2 to 3 months. She denies any unintended weight loss.  Pt had a repeat bx yesterday, and is reporting some scant BRBPR w BM this AM.  Pt will be seeing Dr. Plaza for rad onc consultation later today. Pt also has CT CAP scheduled for Monday.   11/15/24 Colonoscopy with Dr. Murillo.   Report: - Polyp (0.5 cm) in the cecum and appendiceal orifice. (Polypectomy).  - Soft tissue mass 2.5CM, friabile Just w/i the rectum in the distal rectum. (Biopsy).  - Grade/Stage II internal hemorrhoids.  - Normal mucosa in the ascending colon, transverse colon, descending colon, sigmoid colon and rectum compatible with microscopic colitis. (Biopsy)   Pathology:  F. RECTUM, MASS, BIOPSY  - Rectal mucosa, partially denuded with ischemic changes, focal crypt rupture, and an increase in lamina propria fibrosis, suggestive of mucosal prolapse-type change  - Significant inflammation is not seen  - No dysplasia is seen  - Deeper levels were examined       Family cancer hx:  Lung cancer- mother Colon cancer- Pt reports that son Galdino, as a peds ER MD in [place]., and would be the primary [person to make decisions if she loses capacity, but that she expects that her other son, Oliver would also be involved in shared decision-making.,   Social Hx: Alcohol use (V49.89) (Z78.9)- occasssional Never a smoker No illicit drug use Retired from employment as    Current Meds Levothyroxine Sodium 75 MCG Oral Tablet; TAKE 1 TABLET BY MOUTH EVERY DAY Mercaptopurine 50 MG Oral Tablet; TAKE 2 TABLETS BY MOUTH EVERY DAY Mupirocin 2 % External Ointment Restasis 0.05 % Ophthalmic Emulsion; 1 drop both eyes Ophthalmic Twice a day (when remembers) Triamcinolone Acetonide 0.1 % External Cream  Health screenings: PAP smear n/a (pt s/p KING/BSO) Mammogram     [de-identified] : here for follow up 1/13/2025 has had 14 of 30 radiation treatments Continues on xeloda no side effects so far Continues to have a cough Rectal pain is much less, no nausea no diarrhea No other complaints.

## 2025-01-14 NOTE — REVIEW OF SYSTEMS
[Diarrhea: Grade 0] : Diarrhea: Grade 0 [Negative] : Allergic/Immunologic [Shortness Of Breath] : shortness of breath [FreeTextEntry6] : Has some shortness of breath while out walking on saturday

## 2025-01-14 NOTE — HISTORY OF PRESENT ILLNESS
[0 - No Distress] : Distress Level: 0 [ECOG Performance Status: 0 - Fully active, able to carry on all pre-disease performance without restriction] : Performance Status: 0 - Fully active, able to carry on all pre-disease performance without restriction [de-identified] : Ms. Pacheco is a 75-year-old female with a newly diagnosed rectal mass.  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 This has resulted in regular colonoscopic treatment evaluation. She reports her last colonoscopy was approximately 3 years ago. With the exam performed November 15, 2024 a 2.5 cm low rectal mass is diagnosed and biopsied. Pt reports over the past few months a feeling of tenesmus, anorectal pelvic floor achy pain which she has needed Tylenol for management. She also reports the appearance of bright red blood per rectum with bowel movements over these past several weeks/2 to 3 months. She denies any unintended weight loss.  Pt had a repeat bx yesterday, and is reporting some scant BRBPR w BM this AM.  Pt will be seeing Dr. Plaza for rad onc consultation later today. Pt also has CT CAP scheduled for Monday.   11/15/24 Colonoscopy with Dr. Murillo.   Report: - Polyp (0.5 cm) in the cecum and appendiceal orifice. (Polypectomy).  - Soft tissue mass 2.5CM, friabile Just w/i the rectum in the distal rectum. (Biopsy).  - Grade/Stage II internal hemorrhoids.  - Normal mucosa in the ascending colon, transverse colon, descending colon, sigmoid colon and rectum compatible with microscopic colitis. (Biopsy)   Pathology:  F. RECTUM, MASS, BIOPSY  - Rectal mucosa, partially denuded with ischemic changes, focal crypt rupture, and an increase in lamina propria fibrosis, suggestive of mucosal prolapse-type change  - Significant inflammation is not seen  - No dysplasia is seen  - Deeper levels were examined       Family cancer hx:  Lung cancer- mother Colon cancer- Pt reports that son Galdino, as a peds ER MD in [place]., and would be the primary [person to make decisions if she loses capacity, but that she expects that her other son, Oliver would also be involved in shared decision-making.,   Social Hx: Alcohol use (V49.89) (Z78.9)- occasssional Never a smoker No illicit drug use Retired from employment as    Current Meds Levothyroxine Sodium 75 MCG Oral Tablet; TAKE 1 TABLET BY MOUTH EVERY DAY Mercaptopurine 50 MG Oral Tablet; TAKE 2 TABLETS BY MOUTH EVERY DAY Mupirocin 2 % External Ointment Restasis 0.05 % Ophthalmic Emulsion; 1 drop both eyes Ophthalmic Twice a day (when remembers) Triamcinolone Acetonide 0.1 % External Cream  Health screenings: PAP smear n/a (pt s/p KING/BSO) Mammogram     [de-identified] : here for follow up 1/13/2025 has had 14 of 30 radiation treatments Continues on xeloda no side effects so far Continues to have a cough Rectal pain is much less, no nausea no diarrhea No other complaints.

## 2025-01-16 NOTE — DISEASE MANAGEMENT
[Clinical] : TNM Stage: c [N/A] : Currently not applicable [FreeTextEntry4] : TBD - undergoing staging evaluation [TTNM] : - [NTNM] : - [MTNM] : - [de-identified] : 2050 [de-identified] : 7795 [de-identified] : anus/pelvis

## 2025-01-16 NOTE — DISEASE MANAGEMENT
[Clinical] : TNM Stage: c [N/A] : Currently not applicable [FreeTextEntry4] : TBD - undergoing staging evaluation [TTNM] : - [NTNM] : - [MTNM] : - [de-identified] : 1977 [de-identified] : [id number] [de-identified] : anus/pelvis

## 2025-01-16 NOTE — REVIEW OF SYSTEMS
[Constipation: Grade 0] : Constipation: Grade 0 [Diarrhea: Grade 0] : Diarrhea: Grade 0 [Rectal Pain: Grade 0] : Rectal Pain: Grade 0 [Fatigue: Grade 0] : Fatigue: Grade 0 [Urinary Retention: Grade 1 - Urinary, suprapubic or intermittent catheter placement not indicated; able to void with some residual] : Urinary Retention: Grade 1 - Urinary, suprapubic or intermittent catheter placement not indicated; able to void with some residual [Urinary Frequency: Grade 1 - Present] : Urinary Frequency: Grade 1 - Present [Pruritus: Grade 0] : Pruritus: Grade 0 [Skin Hyperpigmentation: Grade 0] : Skin Hyperpigmentation: Grade 0 [Dermatitis Radiation: Grade 1 - Faint erythema or dry desquamation] : Dermatitis Radiation: Grade 1 - Faint erythema or dry desquamation [Urinary Retention: Grade 0] : Urinary Retention: Grade 0

## 2025-01-16 NOTE — PHYSICAL EXAM
[] : no respiratory distress [Exaggerated Use Of Accessory Muscles For Inspiration] : no accessory muscle use [Abdomen Soft] : soft [Nondistended] : nondistended [Abdomen Tenderness] : non-tender [Normal External Genitalia] : normal external genitalia  [Normal] : oriented to person, place and time, the affect was normal, the mood was normal and not anxious [FreeTextEntry1] : Exam performed with MA Sandyrin as chaperone; mild perianal erythema but skin is intact [de-identified] : no erythema b/l groin [de-identified] : as above

## 2025-01-16 NOTE — PHYSICAL EXAM
[] : no respiratory distress [Exaggerated Use Of Accessory Muscles For Inspiration] : no accessory muscle use [Abdomen Soft] : soft [Nondistended] : nondistended [Abdomen Tenderness] : non-tender [Normal External Genitalia] : normal external genitalia  [Normal] : oriented to person, place and time, the affect was normal, the mood was normal and not anxious [FreeTextEntry1] : Exam performed with MA Sandyrin as chaperone; mild perianal erythema but skin is intact [de-identified] : no erythema b/l groin [de-identified] : as above

## 2025-01-16 NOTE — HISTORY OF PRESENT ILLNESS
[FreeTextEntry1] : Ms. Pacheco is a 75-year-old female with new rectal cancer.  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 This has resulted in regular colonoscopic treatment evaluation. She reports her last colonoscopy was approximately 3 years ago.  Josefa reports over the past few months a feeling of tenesmus, anorectal pelvic floor achy pain which she has needed Tylenol for management. She also reports the appearance of bright red blood per rectum with bowel movements over these past several weeks/2 to 3 months. She denies any unintended weight loss. Recent blood work from September does not demonstrate anemia.  11/15/24 Colonoscopy with Dr. Murillo. Report: - Polyp (0.5 cm) in the cecum and appendiceal orifice. (Polypectomy). - Soft tissue mass 2.5CM, friabile Just w/i the rectum in the distal rectum. (Biopsy). - Grade/Stage II internal hemorrhoids. - Normal mucosa in the ascending colon, transverse colon, descending colon, sigmoid colon and rectum compatible with microscopic colitis. (Biopsy)  Pathology: FINAL DIAGNOSIS A. CECUM, POLYP, BIOPSY - Tubular adenoma B. RANDOM ASCENDING COLON, BIOPSY - Colonic mucosa with no significant pathologic abnormality C. RANDOM TRANSVERSE COLON, BIOPSY - Colonic mucosa with no significant pathologic abnormality D. RANDOM DESCENDING COLON, BIOPSY - Colonic mucosa with no significant pathologic abnormality E. RANDOM SIGMOID COLON, BIOPSY - Colonic mucosa with no pathologic abnormality F. RECTUM, MASS, BIOPSY - Rectal mucosa, partially denuded with ischemic changes, focal crypt rupture, and an increase in lamina propria fibrosis, suggestive of mucosal prolapse-type change - Significant inflammation is not seen - No dysplasia is seen - Deeper levels were examined - See comment  DIAGNOSIS COMMENT: - No evidence of malignancy is seen in part F. Features suggestive of prolapse-type change/solitary rectal ulcer syndrome are seen. However, it cannot be entirely excluded that this biopsy is not entirely representative of the lesion or that a deeper submucosal lesion is present which is not captured in this biopsy. Clinical and colonoscopic correlation are recommended. - No significant inflammation, dysplasia, or granulomas are seen in the random colon biopsies. - This case was reviewed intradepartmentally with a gastrointestinal subspecialty pathologist with agreement.  11/25/24 MRI Pelvis  11/26/24 repeat biopsy with Dr. Irving  11/27/24 Patient presents today for consultation for radiation.  12/23/24 FX 1 Pt did very well with TX. Today she denies any pain, N/V diarrhea or constipation symptoms.  12/31/24 FX 6. Had some constipation this weekend but it resolved with MiraLAX. She is doing well now and denies any pain or discomfort passing stool or urine. Her appetite has been good  1/7/24 FX 10. Doing well with TX but is starting to have some loose stool 3-4 times per day. She reports that she is also having discomfort in the groin area and is taking Tylenol for this. Her appetite remains good and her weight stable  1/14/2025 Ms. Pacheco presents today for OTV, completed 15/30 fractions to a dose of 2700 cGy. She is overall feeling well. Skin is intact. She is using Aquaphor BID. She denies any bowels or bladder issues. She does notice some slight bladder retention. She is doing Epson salt baths daily. She denies any rectal burning. She is continuing to exercise. She has a good appetite and drinking fluids.

## 2025-01-21 NOTE — VITALS
[Maximal Pain Intensity: 7/10] : 7/10 [Least Pain Intensity: 0/10] : 0/10 [Pain Location: ___] : Pain Location: [unfilled] [80: Normal activity with effort; some signs or symptoms of disease.] : 80: Normal activity with effort; some signs or symptoms of disease.

## 2025-01-23 NOTE — DISEASE MANAGEMENT
[Clinical] : TNM Stage: c [N/A] : Currently not applicable [FreeTextEntry4] : TBD - undergoing staging evaluation [TTNM] : - [NTNM] : - [MTNM] : - [de-identified] : 7692 [de-identified] : [id number] [de-identified] : anus/pelvis

## 2025-01-23 NOTE — REVIEW OF SYSTEMS
[Diarrhea: Grade 1 - Increase of <4 stools per day over baseline; mild increase in ostomy output compared to baseline] : Diarrhea: Grade 1 - Increase of <4 stools per day over baseline; mild increase in ostomy output compared to baseline [Fatigue: Grade 1 - Fatigue relieved by rest] : Fatigue: Grade 1 - Fatigue relieved by rest [Urinary Urgency: Grade 0] : Urinary Urgency: Grade 0 [Urinary Frequency: Grade 0] : Urinary Frequency: Grade 0 [Diarrhea: Grade 0] : Diarrhea: Grade 0

## 2025-01-23 NOTE — PHYSICAL EXAM
[] : no respiratory distress [Exaggerated Use Of Accessory Muscles For Inspiration] : no accessory muscle use [Abdomen Soft] : soft [Nondistended] : nondistended [Abdomen Tenderness] : non-tender [Normal External Genitalia] : normal external genitalia  [Normal] : oriented to person, place and time, the affect was normal, the mood was normal and not anxious [FreeTextEntry1] : Exam performed with MA Sandyrin as chaperone; mild perianal erythema w/o desquamation [de-identified] : mild erythema b/l groin

## 2025-01-23 NOTE — HISTORY OF PRESENT ILLNESS
[FreeTextEntry1] : Ms. Pacheco is a 75-year-old female with new rectal cancer.  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 This has resulted in regular colonoscopic treatment evaluation. She reports her last colonoscopy was approximately 3 years ago.  Josefa reports over the past few months a feeling of tenesmus, anorectal pelvic floor achy pain which she has needed Tylenol for management. She also reports the appearance of bright red blood per rectum with bowel movements over these past several weeks/2 to 3 months. She denies any unintended weight loss. Recent blood work from September does not demonstrate anemia.  11/15/24 Colonoscopy with Dr. Murillo. Report: - Polyp (0.5 cm) in the cecum and appendiceal orifice. (Polypectomy). - Soft tissue mass 2.5CM, friabile Just w/i the rectum in the distal rectum. (Biopsy). - Grade/Stage II internal hemorrhoids. - Normal mucosa in the ascending colon, transverse colon, descending colon, sigmoid colon and rectum compatible with microscopic colitis. (Biopsy)  Pathology: FINAL DIAGNOSIS A. CECUM, POLYP, BIOPSY - Tubular adenoma B. RANDOM ASCENDING COLON, BIOPSY - Colonic mucosa with no significant pathologic abnormality C. RANDOM TRANSVERSE COLON, BIOPSY - Colonic mucosa with no significant pathologic abnormality D. RANDOM DESCENDING COLON, BIOPSY - Colonic mucosa with no significant pathologic abnormality E. RANDOM SIGMOID COLON, BIOPSY - Colonic mucosa with no pathologic abnormality F. RECTUM, MASS, BIOPSY - Rectal mucosa, partially denuded with ischemic changes, focal crypt rupture, and an increase in lamina propria fibrosis, suggestive of mucosal prolapse-type change - Significant inflammation is not seen - No dysplasia is seen - Deeper levels were examined - See comment  DIAGNOSIS COMMENT: - No evidence of malignancy is seen in part F. Features suggestive of prolapse-type change/solitary rectal ulcer syndrome are seen. However, it cannot be entirely excluded that this biopsy is not entirely representative of the lesion or that a deeper submucosal lesion is present which is not captured in this biopsy. Clinical and colonoscopic correlation are recommended. - No significant inflammation, dysplasia, or granulomas are seen in the random colon biopsies. - This case was reviewed intradepartmentally with a gastrointestinal subspecialty pathologist with agreement.  11/25/24 MRI Pelvis  11/26/24 repeat biopsy with Dr. Irving  11/27/24 Patient presents today for consultation for radiation.  12/23/24 FX 1 Pt did very well with TX. Today she denies any pain, N/V diarrhea or constipation symptoms.  12/31/24 FX 6. Had some constipation this weekend but it resolved with MiraLAX. She is doing well now and denies any pain or discomfort passing stool or urine. Her appetite has been good  1/7/24 FX 10. Doing well with TX but is starting to have some loose stool 3-4 times per day. She reports that she is also having discomfort in the groin area and is taking Tylenol for this. Her appetite remains good and her weight stable  1/14/2025 Ms. Pacheco presents today for OTV, completed 15/30 fractions to a dose of 2700 cGy. She is overall feeling well. Skin is intact. She is using Aquaphor BID. She denies any bowels or bladder issues. She does notice some slight bladder retention. She is doing Epson salt baths daily. She denies any rectal burning. She is continuing to exercise. She has a good appetite and drinking fluids.   1/21/2025 Ms. Pacheco presents today for her OTV.  She completed 19/30 fractions to the Anus/pelvis.  She has some burning and soft stools x 5.  She endorses a pain level of 7/10.    She is using Aquaphor to the site as directed.  Her appetite continues to be good

## 2025-01-23 NOTE — PHYSICAL EXAM
[] : no respiratory distress [Exaggerated Use Of Accessory Muscles For Inspiration] : no accessory muscle use [Abdomen Soft] : soft [Nondistended] : nondistended [Abdomen Tenderness] : non-tender [Normal External Genitalia] : normal external genitalia  [Normal] : oriented to person, place and time, the affect was normal, the mood was normal and not anxious [FreeTextEntry1] : Exam performed with MA Sandyrin as chaperone; mild perianal erythema w/o desquamation [de-identified] : mild erythema b/l groin

## 2025-01-23 NOTE — DISEASE MANAGEMENT
[Clinical] : TNM Stage: c [N/A] : Currently not applicable [FreeTextEntry4] : TBD - undergoing staging evaluation [TTNM] : - [NTNM] : - [MTNM] : - [de-identified] : [id number] [de-identified] : [id number] [de-identified] : anus/pelvis

## 2025-01-27 NOTE — HISTORY OF PRESENT ILLNESS
[0 - No Distress] : Distress Level: 0 [ECOG Performance Status: 0 - Fully active, able to carry on all pre-disease performance without restriction] : Performance Status: 0 - Fully active, able to carry on all pre-disease performance without restriction [de-identified] : Ms. Pacheco is a 75-year-old female with a newly diagnosed rectal mass.  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 This has resulted in regular colonoscopic treatment evaluation. She reports her last colonoscopy was approximately 3 years ago. With the exam performed November 15, 2024 a 2.5 cm low rectal mass is diagnosed and biopsied. Pt reports over the past few months a feeling of tenesmus, anorectal pelvic floor achy pain which she has needed Tylenol for management. She also reports the appearance of bright red blood per rectum with bowel movements over these past several weeks/2 to 3 months. She denies any unintended weight loss.  Pt had a repeat bx yesterday, and is reporting some scant BRBPR w BM this AM.  Pt will be seeing Dr. Plaza for rad onc consultation later today. Pt also has CT CAP scheduled for Monday.   11/15/24 Colonoscopy with Dr. Murillo.   Report: - Polyp (0.5 cm) in the cecum and appendiceal orifice. (Polypectomy).  - Soft tissue mass 2.5CM, friabile Just w/i the rectum in the distal rectum. (Biopsy).  - Grade/Stage II internal hemorrhoids.  - Normal mucosa in the ascending colon, transverse colon, descending colon, sigmoid colon and rectum compatible with microscopic colitis. (Biopsy)   Pathology:  F. RECTUM, MASS, BIOPSY  - Rectal mucosa, partially denuded with ischemic changes, focal crypt rupture, and an increase in lamina propria fibrosis, suggestive of mucosal prolapse-type change  - Significant inflammation is not seen  - No dysplasia is seen  - Deeper levels were examined       Family cancer hx:  Lung cancer- mother Colon cancer- Pt reports that son Galdino, as a peds ER MD in [place]., and would be the primary [person to make decisions if she loses capacity, but that she expects that her other son, Oliver would also be involved in shared decision-making.,   Social Hx: Alcohol use (V49.89) (Z78.9)- occasssional Never a smoker No illicit drug use Retired from employment as    Current Meds Levothyroxine Sodium 75 MCG Oral Tablet; TAKE 1 TABLET BY MOUTH EVERY DAY Mercaptopurine 50 MG Oral Tablet; TAKE 2 TABLETS BY MOUTH EVERY DAY Mupirocin 2 % External Ointment Restasis 0.05 % Ophthalmic Emulsion; 1 drop both eyes Ophthalmic Twice a day (when remembers) Triamcinolone Acetonide 0.1 % External Cream  Health screenings: PAP smear n/a (pt s/p KING/BSO) Mammogram     [de-identified] : here for follow up 1/27/25 has had 22 of 30 radiation treatments Continues on xeloda no side effects so far Rectal pain is much less, no nausea no diarrhea.  Does report excoriated skin to rectal area, being treated w topical steroids and Aquaphor.  Has infrequent nausea which responds favorably to zofran.  No vomiting. Reports staying well hydrated.  No other complaints.

## 2025-01-27 NOTE — REVIEW OF SYSTEMS
[Shortness Of Breath] : shortness of breath [Diarrhea: Grade 0] : Diarrhea: Grade 0 [Negative] : Allergic/Immunologic [FreeTextEntry6] : Has some shortness of breath while out walking on saturday

## 2025-01-27 NOTE — ASSESSMENT
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 No EMVI. 3.9 cm CT C/A/P scheduled for 12/2- no metastatic disease. Small indeterminate liver lesion. will continue to monitor Case presented in [facility] Discussed utility of Signatera- ctDNA which is a useful tool for monitoring. Signed up for [employer]ater[employer] today   Reviewed treatment regimen with patient. Plan for Mitomycin/Xeloda Mitomycin 10 mg/m2 Day 1 and 29.  Xeloda daily with radiation: 825 mg/m2 BID on days of radiation  Plan 1/27/25 >CBC, CMP, CEA, Ca 19-9 >signatera >started on 12/16. Now Day 22/30 >c/w Xeloda 1300 mg BID on days of RT.  Pt reports being short 8 tabs of xeloda 150 mg.   Script sent electronically -RTC 1 week for visit with Dr. Luna- pt aware to make apmt.

## 2025-01-27 NOTE — HISTORY OF PRESENT ILLNESS
[0 - No Distress] : Distress Level: 0 [ECOG Performance Status: 0 - Fully active, able to carry on all pre-disease performance without restriction] : Performance Status: 0 - Fully active, able to carry on all pre-disease performance without restriction [de-identified] : Ms. Pacheco is a 75-year-old female with a newly diagnosed rectal mass.  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 This has resulted in regular colonoscopic treatment evaluation. She reports her last colonoscopy was approximately 3 years ago. With the exam performed November 15, 2024 a 2.5 cm low rectal mass is diagnosed and biopsied. Pt reports over the past few months a feeling of tenesmus, anorectal pelvic floor achy pain which she has needed Tylenol for management. She also reports the appearance of bright red blood per rectum with bowel movements over these past several weeks/2 to 3 months. She denies any unintended weight loss.  Pt had a repeat bx yesterday, and is reporting some scant BRBPR w BM this AM.  Pt will be seeing Dr. Plaza for rad onc consultation later today. Pt also has CT CAP scheduled for Monday.   11/15/24 Colonoscopy with Dr. Murillo.   Report: - Polyp (0.5 cm) in the cecum and appendiceal orifice. (Polypectomy).  - Soft tissue mass 2.5CM, friabile Just w/i the rectum in the distal rectum. (Biopsy).  - Grade/Stage II internal hemorrhoids.  - Normal mucosa in the ascending colon, transverse colon, descending colon, sigmoid colon and rectum compatible with microscopic colitis. (Biopsy)   Pathology:  F. RECTUM, MASS, BIOPSY  - Rectal mucosa, partially denuded with ischemic changes, focal crypt rupture, and an increase in lamina propria fibrosis, suggestive of mucosal prolapse-type change  - Significant inflammation is not seen  - No dysplasia is seen  - Deeper levels were examined       Family cancer hx:  Lung cancer- mother Colon cancer- Pt reports that son Galdino, as a peds ER MD in [place]., and would be the primary [person to make decisions if she loses capacity, but that she expects that her other son, Oliver would also be involved in shared decision-making.,   Social Hx: Alcohol use (V49.89) (Z78.9)- occasssional Never a smoker No illicit drug use Retired from employment as    Current Meds Levothyroxine Sodium 75 MCG Oral Tablet; TAKE 1 TABLET BY MOUTH EVERY DAY Mercaptopurine 50 MG Oral Tablet; TAKE 2 TABLETS BY MOUTH EVERY DAY Mupirocin 2 % External Ointment Restasis 0.05 % Ophthalmic Emulsion; 1 drop both eyes Ophthalmic Twice a day (when remembers) Triamcinolone Acetonide 0.1 % External Cream  Health screenings: PAP smear n/a (pt s/p KING/BSO) Mammogram     [de-identified] : here for follow up 1/27/25 has had 22 of 30 radiation treatments Continues on xeloda no side effects so far Rectal pain is much less, no nausea no diarrhea.  Does report excoriated skin to rectal area, being treated w topical steroids and Aquaphor.  Has infrequent nausea which responds favorably to zofran.  No vomiting. Reports staying well hydrated.  No other complaints.

## 2025-01-28 NOTE — VITALS
[Maximal Pain Intensity: 6/10] : 6/10 [OTC] : OTC [80: Normal activity with effort; some signs or symptoms of disease.] : 80: Normal activity with effort; some signs or symptoms of disease.

## 2025-01-29 NOTE — DISEASE MANAGEMENT
[Clinical] : TNM Stage: c [N/A] : Currently not applicable [FreeTextEntry4] : TBD - undergoing staging evaluation [TTNM] : - [NTNM] : - [MTNM] : - [de-identified] : [id number] [de-identified] : [id number] [de-identified] : anus/pelvis

## 2025-01-29 NOTE — HISTORY OF PRESENT ILLNESS
[FreeTextEntry1] : Ms. Pacheco is a 75-year-old female with new rectal cancer.  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 This has resulted in regular colonoscopic treatment evaluation. She reports her last colonoscopy was approximately 3 years ago.  Josefa reports over the past few months a feeling of tenesmus, anorectal pelvic floor achy pain which she has needed Tylenol for management. She also reports the appearance of bright red blood per rectum with bowel movements over these past several weeks/2 to 3 months. She denies any unintended weight loss. Recent blood work from September does not demonstrate anemia.  11/15/24 Colonoscopy with Dr. Murillo. Report: - Polyp (0.5 cm) in the cecum and appendiceal orifice. (Polypectomy). - Soft tissue mass 2.5CM, friabile Just w/i the rectum in the distal rectum. (Biopsy). - Grade/Stage II internal hemorrhoids. - Normal mucosa in the ascending colon, transverse colon, descending colon, sigmoid colon and rectum compatible with microscopic colitis. (Biopsy)  Pathology: FINAL DIAGNOSIS A. CECUM, POLYP, BIOPSY - Tubular adenoma B. RANDOM ASCENDING COLON, BIOPSY - Colonic mucosa with no significant pathologic abnormality C. RANDOM TRANSVERSE COLON, BIOPSY - Colonic mucosa with no significant pathologic abnormality D. RANDOM DESCENDING COLON, BIOPSY - Colonic mucosa with no significant pathologic abnormality E. RANDOM SIGMOID COLON, BIOPSY - Colonic mucosa with no pathologic abnormality F. RECTUM, MASS, BIOPSY - Rectal mucosa, partially denuded with ischemic changes, focal crypt rupture, and an increase in lamina propria fibrosis, suggestive of mucosal prolapse-type change - Significant inflammation is not seen - No dysplasia is seen - Deeper levels were examined - See comment  DIAGNOSIS COMMENT: - No evidence of malignancy is seen in part F. Features suggestive of prolapse-type change/solitary rectal ulcer syndrome are seen. However, it cannot be entirely excluded that this biopsy is not entirely representative of the lesion or that a deeper submucosal lesion is present which is not captured in this biopsy. Clinical and colonoscopic correlation are recommended. - No significant inflammation, dysplasia, or granulomas are seen in the random colon biopsies. - This case was reviewed intradepartmentally with a gastrointestinal subspecialty pathologist with agreement.  11/25/24 MRI Pelvis  11/26/24 repeat biopsy with Dr. Irving  11/27/24 Patient presents today for consultation for radiation.  12/23/24 FX 1 Pt did very well with TX. Today she denies any pain, N/V diarrhea or constipation symptoms.  12/31/24 FX 6. Had some constipation this weekend but it resolved with MiraLAX. She is doing well now and denies any pain or discomfort passing stool or urine. Her appetite has been good  1/7/24 FX 10. Doing well with TX but is starting to have some loose stool 3-4 times per day. She reports that she is also having discomfort in the groin area and is taking Tylenol for this. Her appetite remains good and her weight stable  1/14/2025 Ms. Pacheco presents today for OTV, completed 15/30 fractions to a dose of 2700 cGy. She is overall feeling well. Skin is intact. She is using Aquaphor BID. She denies any bowels or bladder issues. She does notice some slight bladder retention. She is doing Epson salt baths daily. She denies any rectal burning. She is continuing to exercise. She has a good appetite and drinking fluids.   1/21/2025 Ms. Pacheco presents today for her OTV.  She completed 19/30 fractions to the Anus/pelvis.  She has some burning and soft stools x 5.  She endorses a pain level of 7/10.    She is using Aquaphor to the site as directed.  Her appetite continues to be good  1/28/2025 Ms. Lisa presents today for her OTV.  She completed 24/30 fractions to the Anus/Pelvis. She is c/o frequent urination. She is using Silvadene cream and Aquaphor, but she is having a lot of burning in the anal area.

## 2025-01-29 NOTE — DISEASE MANAGEMENT
[Clinical] : TNM Stage: c [N/A] : Currently not applicable [FreeTextEntry4] : TBD - undergoing staging evaluation [TTNM] : - [NTNM] : - [MTNM] : - [de-identified] : [id number] [de-identified] : [id number] [de-identified] : anus/pelvis

## 2025-01-29 NOTE — PHYSICAL EXAM
[Outer Ear] : the ears and nose were normal in appearance [Exaggerated Use Of Accessory Muscles For Inspiration] : no accessory muscle use [] : no respiratory distress [Abdomen Soft] : soft [Nondistended] : nondistended [Abdomen Tenderness] : non-tender [Normal External Genitalia] : normal external genitalia  [Normal] : oriented to person, place and time, the affect was normal, the mood was normal and not anxious [FreeTextEntry1] : Exam performed with REGINALDO Delgadorin as chaperone; mild perianal erythema, enlarged external hemorrhoids  [de-identified] : mild erythema b/l groin

## 2025-01-29 NOTE — PHYSICAL EXAM
[Outer Ear] : the ears and nose were normal in appearance [Exaggerated Use Of Accessory Muscles For Inspiration] : no accessory muscle use [] : no respiratory distress [Abdomen Soft] : soft [Nondistended] : nondistended [Abdomen Tenderness] : non-tender [Normal External Genitalia] : normal external genitalia  [Normal] : oriented to person, place and time, the affect was normal, the mood was normal and not anxious [FreeTextEntry1] : Exam performed with REGINALDO Delgadorin as chaperone; mild perianal erythema, enlarged external hemorrhoids  [de-identified] : mild erythema b/l groin

## 2025-01-29 NOTE — REVIEW OF SYSTEMS
[Constipation: Grade 0] : Constipation: Grade 0 [Diarrhea: Grade 0] : Diarrhea: Grade 0 [Rectal Pain: Grade 1 - Mild pain] : Rectal Pain: Grade 1 - Mild pain [Fatigue: Grade 1 - Fatigue relieved by rest] : Fatigue: Grade 1 - Fatigue relieved by rest [Urinary Frequency: Grade 1 - Present] : Urinary Frequency: Grade 1 - Present [Skin Hyperpigmentation: Grade 1 - Hyperpigmentation covering <10% BSA; no psychosocial impact] : Skin Hyperpigmentation: Grade 1 - Hyperpigmentation covering <10% BSA; no psychosocial impact [Dermatitis Radiation: Grade 1 - Faint erythema or dry desquamation] : Dermatitis Radiation: Grade 1 - Faint erythema or dry desquamation [Skin Hyperpigmentation: Grade 0] : Skin Hyperpigmentation: Grade 0

## 2025-02-04 NOTE — ASSESSMENT
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 No EMVI. 3.9 cm CT C/A/P scheduled for 12/2- no metastatic disease. Small indeterminate liver lesion. will continue to monitor Case presented in TB Discussed utility of Signater[employer]- ctDNA which is a useful tool for monitoring. Signed up for [employer] today   Reviewed treatment regimen with patient. Plan for Mitomycin/Xeloda Mitomycin 10 mg/m2 Day 1 and 29.  Xeloda daily with radiation: 825 mg/m2 BID on days of radiation started on 12/16. Now Day 28/30   Plan  >CBC, CMP. monitor counts as [facility] is myelosuppressive >add CEA, Ca 19-9,  signatera at next office visit >c/w Xeloda 1300 mg BID on days of RT.   >plan to repeat imaging in 4-6 weeks post ChemoRT -RTC 1 week for lab visit to monitor counts

## 2025-02-04 NOTE — REVIEW OF SYSTEMS
[Shortness Of Breath] : shortness of breath [Diarrhea: Grade 0] : Diarrhea: Grade 0 [Negative] : Allergic/Immunologic [FreeTextEntry6] : Has some shortness of breath while out walking on saturday [FreeTextEntry7] : Minor pain with bowel movements

## 2025-02-04 NOTE — HISTORY OF PRESENT ILLNESS
[0 - No Distress] : Distress Level: 0 [ECOG Performance Status: 0 - Fully active, able to carry on all pre-disease performance without restriction] : Performance Status: 0 - Fully active, able to carry on all pre-disease performance without restriction [de-identified] : Ms. Pacheco is a 75-year-old female with a newly diagnosed rectal mass.  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 This has resulted in regular colonoscopic treatment evaluation. She reports her last colonoscopy was approximately 3 years ago. With the exam performed November 15, 2024 a 2.5 cm low rectal mass is diagnosed and biopsied. Pt reports over the past few months a feeling of tenesmus, anorectal pelvic floor achy pain which she has needed Tylenol for management. She also reports the appearance of bright red blood per rectum with bowel movements over these past several weeks/2 to 3 months. She denies any unintended weight loss.  Pt had a repeat bx yesterday, and is reporting some scant BRBPR w BM this AM.  Pt will be seeing Dr. Plaza for rad onc consultation later today. Pt also has CT CAP scheduled for Monday.   11/15/24 Colonoscopy with Dr. Murillo.   Report: - Polyp (0.5 cm) in the cecum and appendiceal orifice. (Polypectomy).  - Soft tissue mass 2.5CM, friabile Just w/i the rectum in the distal rectum. (Biopsy).  - Grade/Stage II internal hemorrhoids.  - Normal mucosa in the ascending colon, transverse colon, descending colon, sigmoid colon and rectum compatible with microscopic colitis. (Biopsy)   Pathology:  F. RECTUM, MASS, BIOPSY  - Rectal mucosa, partially denuded with ischemic changes, focal crypt rupture, and an increase in lamina propria fibrosis, suggestive of mucosal prolapse-type change  - Significant inflammation is not seen  - No dysplasia is seen  - Deeper levels were examined       Family cancer hx:  Lung cancer- mother Colon cancer- Pt reports that son Galdino, as a peds ER MD in [place]., and would be the primary [person to make decisions if she loses capacity, but that she expects that her other son, Oliver would also be involved in shared decision-making.,   Social Hx: Alcohol use (V49.89) (Z78.9)- occasssional Never a smoker No illicit drug use Retired from employment as    Current Meds Levothyroxine Sodium 75 MCG Oral Tablet; TAKE 1 TABLET BY MOUTH EVERY DAY Mercaptopurine 50 MG Oral Tablet; TAKE 2 TABLETS BY MOUTH EVERY DAY Mupirocin 2 % External Ointment Restasis 0.05 % Ophthalmic Emulsion; 1 drop both eyes Ophthalmic Twice a day (when remembers) Triamcinolone Acetonide 0.1 % External Cream  Health screenings: PAP smear n/a (pt s/p KING/BSO) Mammogram     [de-identified] : here for follow up 2/3/2025. Has had 28 of 30 radiation treatments. Tolerating ok besides fatigue and irritation in the rectal area, being treated w topical steroids and Aquaphor. Reports pain with BM's and small amount of blood noted. Continues on xeloda-tolerating well.  Nausea improved. No vomiting. Reports staying well hydrated.  No other complaints. Walking daily

## 2025-02-04 NOTE — HISTORY OF PRESENT ILLNESS
[0 - No Distress] : Distress Level: 0 [ECOG Performance Status: 0 - Fully active, able to carry on all pre-disease performance without restriction] : Performance Status: 0 - Fully active, able to carry on all pre-disease performance without restriction [de-identified] : Ms. Pacheco is a 75-year-old female with a newly diagnosed rectal mass.  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 This has resulted in regular colonoscopic treatment evaluation. She reports her last colonoscopy was approximately 3 years ago. With the exam performed November 15, 2024 a 2.5 cm low rectal mass is diagnosed and biopsied. Pt reports over the past few months a feeling of tenesmus, anorectal pelvic floor achy pain which she has needed Tylenol for management. She also reports the appearance of bright red blood per rectum with bowel movements over these past several weeks/2 to 3 months. She denies any unintended weight loss.  Pt had a repeat bx yesterday, and is reporting some scant BRBPR w BM this AM.  Pt will be seeing Dr. Plaza for rad onc consultation later today. Pt also has CT CAP scheduled for Monday.   11/15/24 Colonoscopy with Dr. Murillo.   Report: - Polyp (0.5 cm) in the cecum and appendiceal orifice. (Polypectomy).  - Soft tissue mass 2.5CM, friabile Just w/i the rectum in the distal rectum. (Biopsy).  - Grade/Stage II internal hemorrhoids.  - Normal mucosa in the ascending colon, transverse colon, descending colon, sigmoid colon and rectum compatible with microscopic colitis. (Biopsy)   Pathology:  F. RECTUM, MASS, BIOPSY  - Rectal mucosa, partially denuded with ischemic changes, focal crypt rupture, and an increase in lamina propria fibrosis, suggestive of mucosal prolapse-type change  - Significant inflammation is not seen  - No dysplasia is seen  - Deeper levels were examined       Family cancer hx:  Lung cancer- mother Colon cancer- Pt reports that son Galdino, as a peds ER MD in [place]., and would be the primary [person to make decisions if she loses capacity, but that she expects that her other son, Oliver would also be involved in shared decision-making.,   Social Hx: Alcohol use (V49.89) (Z78.9)- occasssional Never a smoker No illicit drug use Retired from employment as    Current Meds Levothyroxine Sodium 75 MCG Oral Tablet; TAKE 1 TABLET BY MOUTH EVERY DAY Mercaptopurine 50 MG Oral Tablet; TAKE 2 TABLETS BY MOUTH EVERY DAY Mupirocin 2 % External Ointment Restasis 0.05 % Ophthalmic Emulsion; 1 drop both eyes Ophthalmic Twice a day (when remembers) Triamcinolone Acetonide 0.1 % External Cream  Health screenings: PAP smear n/a (pt s/p KING/BSO) Mammogram     [de-identified] : here for follow up 2/3/2025. Has had 28 of 30 radiation treatments. Tolerating ok besides fatigue and irritation in the rectal area, being treated w topical steroids and Aquaphor. Reports pain with BM's and small amount of blood noted. Continues on xeloda-tolerating well.  Nausea improved. No vomiting. Reports staying well hydrated.  No other complaints. Walking daily

## 2025-02-04 NOTE — HISTORY OF PRESENT ILLNESS
From: Abdiel Akhtar  To: Brenden Chawla MD  Sent: 5/31/2019 10:15 AM CDT  Subject: Lab Test or Test Related Question    Having blood drawn thursday can we make sure everything is done so I not missing something Thanks Abdiel   [0 - No Distress] : Distress Level: 0 [ECOG Performance Status: 0 - Fully active, able to carry on all pre-disease performance without restriction] : Performance Status: 0 - Fully active, able to carry on all pre-disease performance without restriction [de-identified] : Ms. Pacheco is a 75-year-old female with a newly diagnosed rectal mass.  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 This has resulted in regular colonoscopic treatment evaluation. She reports her last colonoscopy was approximately 3 years ago. With the exam performed November 15, 2024 a 2.5 cm low rectal mass is diagnosed and biopsied. Pt reports over the past few months a feeling of tenesmus, anorectal pelvic floor achy pain which she has needed Tylenol for management. She also reports the appearance of bright red blood per rectum with bowel movements over these past several weeks/2 to 3 months. She denies any unintended weight loss.  Pt had a repeat bx yesterday, and is reporting some scant BRBPR w BM this AM.  Pt will be seeing Dr. Plaza for rad onc consultation later today. Pt also has CT CAP scheduled for Monday.   11/15/24 Colonoscopy with Dr. Murillo.   Report: - Polyp (0.5 cm) in the cecum and appendiceal orifice. (Polypectomy).  - Soft tissue mass 2.5CM, friabile Just w/i the rectum in the distal rectum. (Biopsy).  - Grade/Stage II internal hemorrhoids.  - Normal mucosa in the ascending colon, transverse colon, descending colon, sigmoid colon and rectum compatible with microscopic colitis. (Biopsy)   Pathology:  F. RECTUM, MASS, BIOPSY  - Rectal mucosa, partially denuded with ischemic changes, focal crypt rupture, and an increase in lamina propria fibrosis, suggestive of mucosal prolapse-type change  - Significant inflammation is not seen  - No dysplasia is seen  - Deeper levels were examined       Family cancer hx:  Lung cancer- mother Colon cancer- Pt reports that son Galdnio, as a peds ER MD in [place]., and would be the primary [person to make decisions if she loses capacity, but that she expects that her other son, Oliver would also be involved in shared decision-making.,   Social Hx: Alcohol use (V49.89) (Z78.9)- occasssional Never a smoker No illicit drug use Retired from employment as    Current Meds Levothyroxine Sodium 75 MCG Oral Tablet; TAKE 1 TABLET BY MOUTH EVERY DAY Mercaptopurine 50 MG Oral Tablet; TAKE 2 TABLETS BY MOUTH EVERY DAY Mupirocin 2 % External Ointment Restasis 0.05 % Ophthalmic Emulsion; 1 drop both eyes Ophthalmic Twice a day (when remembers) Triamcinolone Acetonide 0.1 % External Cream  Health screenings: PAP smear n/a (pt s/p KING/BSO) Mammogram     [de-identified] : here for follow up 2/3/2025. Has had 28 of 30 radiation treatments. Tolerating ok besides fatigue and irritation in the rectal area, being treated w topical steroids and Aquaphor. Reports pain with BM's and small amount of blood noted. Continues on xeloda-tolerating well.  Nausea improved. No vomiting. Reports staying well hydrated.  No other complaints. Walking daily

## 2025-02-05 NOTE — REVIEW OF SYSTEMS
[Anal Pain: Grade 2 - Moderate pain; limiting instrumental ADL] : Anal Pain: Grade 2 - Moderate pain; limiting instrumental ADL [Constipation: Grade 0] : Constipation: Grade 0 [Diarrhea: Grade 0] : Diarrhea: Grade 0 [Fatigue: Grade 1 - Fatigue relieved by rest] : Fatigue: Grade 1 - Fatigue relieved by rest [Pruritus: Grade 0] : Pruritus: Grade 0 [Dermatitis Radiation: Grade 1 - Faint erythema or dry desquamation] : Dermatitis Radiation: Grade 1 - Faint erythema or dry desquamation [Urinary Tract Pain: Grade 0] : Urinary Tract Pain: Grade 0 [Urinary Urgency: Grade 0] : Urinary Urgency: Grade 0 [Urinary Frequency: Grade 0] : Urinary Frequency: Grade 0 [Genital Edema: Grade 0] : Genital Edema: Grade 0 [Dermatitis Radiation: Grade 2 - Moderate to brisk erythema; patchy moist desquamation, mostly confined to skin folds and creases; moderate edema] : Dermatitis Radiation: Grade 2 - Moderate to brisk erythema; patchy moist desquamation, mostly confined to skin folds and creases; moderate edema

## 2025-02-05 NOTE — DISEASE MANAGEMENT
[Clinical] : TNM Stage: c [N/A] : Currently not applicable [FreeTextEntry4] : TBD - undergoing staging evaluation [TTNM] : - [NTNM] : - [MTNM] : - [[place] [id number]] : [id number] [de-identified] : [id number] [de-identified] : anus/pelvis

## 2025-02-05 NOTE — DISEASE MANAGEMENT
[Clinical] : TNM Stage: c [N/A] : Currently not applicable [FreeTextEntry4] : TBD - undergoing staging evaluation [TTNM] : - [NTNM] : - [MTNM] : - [[place] [id number]] : [id number] [de-identified] : [id number] [de-identified] : anus/pelvis

## 2025-02-05 NOTE — PHYSICAL EXAM
[] : no respiratory distress [Exaggerated Use Of Accessory Muscles For Inspiration] : no accessory muscle use [Abdomen Soft] : soft [Nondistended] : nondistended [Abdomen Tenderness] : non-tender [Normal] : oriented to person, place and time, the affect was normal, the mood was normal and not anxious [FreeTextEntry1] : erythema with patchy moist desquamation perianal skin [de-identified] : no genital edema or desquamation [de-identified] : mild erythema b/l groin

## 2025-02-05 NOTE — HISTORY OF PRESENT ILLNESS
[FreeTextEntry1] : Ms. Pacheco is a 75-year-old female with new rectal cancer.  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 This has resulted in regular colonoscopic treatment evaluation. She reports her last colonoscopy was approximately 3 years ago.  Josefa reports over the past few months a feeling of tenesmus, anorectal pelvic floor achy pain which she has needed Tylenol for management. She also reports the appearance of bright red blood per rectum with bowel movements over these past several weeks/2 to 3 months. She denies any unintended weight loss. Recent blood work from September does not demonstrate anemia.  11/15/24 Colonoscopy with Dr. Murillo. Report: - Polyp (0.5 cm) in the cecum and appendiceal orifice. (Polypectomy). - Soft tissue mass 2.5CM, friabile Just w/i the rectum in the distal rectum. (Biopsy). - Grade/Stage II internal hemorrhoids. - Normal mucosa in the ascending colon, transverse colon, descending colon, sigmoid colon and rectum compatible with microscopic colitis. (Biopsy)  Pathology: FINAL DIAGNOSIS A. CECUM, POLYP, BIOPSY - Tubular adenoma B. RANDOM ASCENDING COLON, BIOPSY - Colonic mucosa with no significant pathologic abnormality C. RANDOM TRANSVERSE COLON, BIOPSY - Colonic mucosa with no significant pathologic abnormality D. RANDOM DESCENDING COLON, BIOPSY - Colonic mucosa with no significant pathologic abnormality E. RANDOM SIGMOID COLON, BIOPSY - Colonic mucosa with no pathologic abnormality F. RECTUM, MASS, BIOPSY - Rectal mucosa, partially denuded with ischemic changes, focal crypt rupture, and an increase in lamina propria fibrosis, suggestive of mucosal prolapse-type change - Significant inflammation is not seen - No dysplasia is seen - Deeper levels were examined - See comment  DIAGNOSIS COMMENT: - No evidence of malignancy is seen in part F. Features suggestive of prolapse-type change/solitary rectal ulcer syndrome are seen. However, it cannot be entirely excluded that this biopsy is not entirely representative of the lesion or that a deeper submucosal lesion is present which is not captured in this biopsy. Clinical and colonoscopic correlation are recommended. - No significant inflammation, dysplasia, or granulomas are seen in the random colon biopsies. - This case was reviewed intradepartmentally with a gastrointestinal subspecialty pathologist with agreement.  11/25/24 MRI Pelvis  11/26/24 repeat biopsy with Dr. Irving  11/27/24 Patient presents today for consultation for radiation.  12/23/24 FX 1 Pt did very well with TX. Today she denies any pain, N/V diarrhea or constipation symptoms.  12/31/24 FX 6. Had some constipation this weekend but it resolved with MiraLAX. She is doing well now and denies any pain or discomfort passing stool or urine. Her appetite has been good  1/7/24 FX 10. Doing well with TX but is starting to have some loose stool 3-4 times per day. She reports that she is also having discomfort in the groin area and is taking Tylenol for this. Her appetite remains good and her weight stable  1/14/2025 Ms. Pacheco presents today for OTV, completed 15/30 fractions to a dose of 2700 cGy. She is overall feeling well. Skin is intact. She is using Aquaphor BID. She denies any bowels or bladder issues. She does notice some slight bladder retention. She is doing Epson salt baths daily. She denies any rectal burning. She is continuing to exercise. She has a good appetite and drinking fluids.   1/21/2025 Ms. Pacheco presents today for her OTV.  She completed 19/30 fractions to the Anus/pelvis.  She has some burning and soft stools x 5.  She endorses a pain level of 7/10.    She is using Aquaphor to the site as directed.  Her appetite continues to be good  1/28/2025 Ms. Lisa presents today for her OTV.  She completed 24/30 fractions to the Anus/Pelvis. She is c/o frequent urination. She is using Silvadene cream and Aquaphor, but she is having a lot of burning in the anal area.   2/4/2025 Ms. Lisa presents today for her OTV.  She completed 29/30 fractions to the Anus/Pelvis.  She reports that she is having pain at the area and has been using Tramadol for thisShe is also using Aquaphor and Silvadene cream to the area as directed.  Her appetite remains good

## 2025-02-05 NOTE — PHYSICAL EXAM
[] : no respiratory distress [Exaggerated Use Of Accessory Muscles For Inspiration] : no accessory muscle use [Abdomen Soft] : soft [Nondistended] : nondistended [Abdomen Tenderness] : non-tender [Normal] : oriented to person, place and time, the affect was normal, the mood was normal and not anxious [FreeTextEntry1] : erythema with patchy moist desquamation perianal skin [de-identified] : no genital edema or desquamation [de-identified] : mild erythema b/l groin

## 2025-02-06 NOTE — HISTORY OF PRESENT ILLNESS
[FreeTextEntry1] : Ms. Pacheco is a 75-year-old female with new rectal cancer.  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 This has resulted in regular colonoscopic treatment evaluation. She reports her last colonoscopy was approximately 3 years ago.  Josefa reports over the past few months a feeling of tenesmus, anorectal pelvic floor achy pain which she has needed Tylenol for management. She also reports the appearance of bright red blood per rectum with bowel movements over these past several weeks/2 to 3 months. She denies any unintended weight loss. Recent blood work from September does not demonstrate anemia.  11/15/24 Colonoscopy with Dr. Murillo. Report: - Polyp (0.5 cm) in the cecum and appendiceal orifice. (Polypectomy). - Soft tissue mass 2.5CM, friabile Just w/i the rectum in the distal rectum. (Biopsy). - Grade/Stage II internal hemorrhoids. - Normal mucosa in the ascending colon, transverse colon, descending colon, sigmoid colon and rectum compatible with microscopic colitis. (Biopsy)  Pathology: FINAL DIAGNOSIS A. CECUM, POLYP, BIOPSY - Tubular adenoma B. RANDOM ASCENDING COLON, BIOPSY - Colonic mucosa with no significant pathologic abnormality C. RANDOM TRANSVERSE COLON, BIOPSY - Colonic mucosa with no significant pathologic abnormality D. RANDOM DESCENDING COLON, BIOPSY - Colonic mucosa with no significant pathologic abnormality E. RANDOM SIGMOID COLON, BIOPSY - Colonic mucosa with no pathologic abnormality F. RECTUM, MASS, BIOPSY - Rectal mucosa, partially denuded with ischemic changes, focal crypt rupture, and an increase in lamina propria fibrosis, suggestive of mucosal prolapse-type change - Significant inflammation is not seen - No dysplasia is seen - Deeper levels were examined - See comment  DIAGNOSIS COMMENT: - No evidence of malignancy is seen in part F. Features suggestive of prolapse-type change/solitary rectal ulcer syndrome are seen. However, it cannot be entirely excluded that this biopsy is not entirely representative of the lesion or that a deeper submucosal lesion is present which is not captured in this biopsy. Clinical and colonoscopic correlation are recommended. - No significant inflammation, dysplasia, or granulomas are seen in the random colon biopsies. - This case was reviewed intradepartmentally with a gastrointestinal subspecialty pathologist with agreement.  11/25/24 MRI Pelvis  11/26/24 repeat biopsy with Dr. Irving  11/27/24 Patient presents today for consultation for radiation.  12/23/24 FX 1 Pt did very well with TX. Today she denies any pain, N/V diarrhea or constipation symptoms.  12/31/24 FX 6. Had some constipation this weekend but it resolved with MiraLAX. She is doing well now and denies any pain or discomfort passing stool or urine. Her appetite has been good  1/7/24 FX 10. Doing well with TX but is starting to have some loose stool 3-4 times per day. She reports that she is also having discomfort in the groin area and is taking Tylenol for this. Her appetite remains good and her weight stable  1/14/2025 Ms. Pacheco presents today for OTV, completed 15/30 fractions to a dose of 2700 cGy. She is overall feeling well. Skin is intact. She is using Aquaphor BID. She denies any bowels or bladder issues. She does notice some slight bladder retention. She is doing Epson salt baths daily. She denies any rectal burning. She is continuing to exercise. She has a good appetite and drinking fluids.   1/21/2025 Ms. Pacheco presents today for her OTV.  She completed 19/30 fractions to the Anus/pelvis.  She has some burning and soft stools x 5.  She endorses a pain level of 7/10.    She is using Aquaphor to the site as directed.  Her appetite continues to be good  1/28/2025 Ms. Lisa presents today for her OTV.  She completed 24/30 fractions to the Anus/Pelvis. She is c/o frequent urination. She is using Silvadene cream and Aquaphor, but she is having a lot of burning in the anal area.   2/4/2025 Ms. Lisa presents today for her OTV.  She completed 29/30 fractions to the Anus/Pelvis.  She reports that she is having pain at the area and has been using Tramadol for thisShe is also using Aquaphor and Silvadene cream to the area as directed.  Her appetite remains good  2/19/25 Ms Pacheco returns today for PTE she is now s/p RT to the Anus 30 Fxs to 54 Gy completed on 2/5/25.  She has a f/u with Dr Jamison's NP on March 10th.

## 2025-02-06 NOTE — DISEASE MANAGEMENT
[Clinical] : TNM Stage: c [FreeTextEntry4] : TBD - undergoing staging evaluation [TTNM] : - [NTNM] : - [MTNM] : - [N/A] : Currently not applicable [de-identified] : [id number] [de-identified] : [id number] [de-identified] : anus/pelvis

## 2025-02-20 NOTE — PHYSICAL EXAM
[] : no respiratory distress [Exaggerated Use Of Accessory Muscles For Inspiration] : no accessory muscle use [Abdomen Soft] : soft [Nondistended] : nondistended [Abdomen Tenderness] : non-tender [Musculoskeletal - Swelling] : no joint swelling [Normal] : oriented to person, place and time, the affect was normal, the mood was normal and not anxious [FreeTextEntry1] : small external hemorrhoids now visible on the anterior anal verge; focal patches of moist desquamation significantly improved from prior [de-identified] : no genital edema or erythema [de-identified] : mild erythema and dry desquamation b/l groin; no palpable nodes

## 2025-02-20 NOTE — REVIEW OF SYSTEMS
[Fatigue] : fatigue [Negative] : Heme/Lymph [Constipation] : no constipation [Diarrhea] : no diarrhea [de-identified] : hyperpigmentation in the groin area

## 2025-02-20 NOTE — REVIEW OF SYSTEMS
[Fatigue] : fatigue [Negative] : Heme/Lymph [Constipation] : no constipation [Diarrhea] : no diarrhea [de-identified] : hyperpigmentation in the groin area

## 2025-02-20 NOTE — PHYSICAL EXAM
[] : no respiratory distress [Exaggerated Use Of Accessory Muscles For Inspiration] : no accessory muscle use [Abdomen Soft] : soft [Nondistended] : nondistended [Abdomen Tenderness] : non-tender [Musculoskeletal - Swelling] : no joint swelling [Normal] : oriented to person, place and time, the affect was normal, the mood was normal and not anxious [FreeTextEntry1] : small external hemorrhoids now visible on the anterior anal verge; focal patches of moist desquamation significantly improved from prior [de-identified] : no genital edema or erythema [de-identified] : mild erythema and dry desquamation b/l groin; no palpable nodes

## 2025-02-20 NOTE — HISTORY OF PRESENT ILLNESS
[FreeTextEntry1] : Ms. Pacheco is a 75-year-old female diagnosed with anal cancer.  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 This has resulted in regular colonoscopic treatment evaluation. She reports her last colonoscopy was approximately 3 years ago.  Josefa reports over the past few months a feeling of tenesmus, anorectal pelvic floor achy pain which she has needed Tylenol for management. She also reports the appearance of bright red blood per rectum with bowel movements over these past several weeks/2 to 3 months. She denies any unintended weight loss. Recent blood work from September does not demonstrate anemia.  11/15/24 Colonoscopy with Dr. Murillo. Report: - Polyp (0.5 cm) in the cecum and appendiceal orifice. (Polypectomy). - Soft tissue mass 2.5CM, friabile Just w/i the rectum in the distal rectum. (Biopsy). - Grade/Stage II internal hemorrhoids. - Normal mucosa in the ascending colon, transverse colon, descending colon, sigmoid colon and rectum compatible with microscopic colitis. (Biopsy)  Pathology: FINAL DIAGNOSIS A. CECUM, POLYP, BIOPSY - Tubular adenoma B. RANDOM ASCENDING COLON, BIOPSY - Colonic mucosa with no significant pathologic abnormality C. RANDOM TRANSVERSE COLON, BIOPSY - Colonic mucosa with no significant pathologic abnormality D. RANDOM DESCENDING COLON, BIOPSY - Colonic mucosa with no significant pathologic abnormality E. RANDOM SIGMOID COLON, BIOPSY - Colonic mucosa with no pathologic abnormality F. RECTUM, MASS, BIOPSY - Rectal mucosa, partially denuded with ischemic changes, focal crypt rupture, and an increase in lamina propria fibrosis, suggestive of mucosal prolapse-type change - Significant inflammation is not seen - No dysplasia is seen - Deeper levels were examined - See comment  DIAGNOSIS COMMENT: - No evidence of malignancy is seen in part F. Features suggestive of prolapse-type change/solitary rectal ulcer syndrome are seen. However, it cannot be entirely excluded that this biopsy is not entirely representative of the lesion or that a deeper submucosal lesion is present which is not captured in this biopsy. Clinical and colonoscopic correlation are recommended. - No significant inflammation, dysplasia, or granulomas are seen in the random colon biopsies. - This case was reviewed intradepartmentally with a gastrointestinal subspecialty pathologist with agreement.  11/25/24 MRI Pelvis  11/26/24 repeat biopsy with Dr. Irving FINAL PATHOLOGIC DIAGNOSIS  A. RECTAL MASS BIOPSIES: -Invasive moderately differentiated squamous cell carcinoma, involving rectal mucosa, see Microscopic Description   11/27/24 Patient presents today for consultation for radiation.  1/10/25  MRI of pelvis PROCEDURE:  MR of the pelvis was performed as per Anal Cancer Protocol including thin section T2 weighted imaging.  Rectal Gel: 60 cc rectal gel administered.   FINDINGS:   TUMOR SIZE: 3.9 cm x 2.5 cm   TUMOR CHARACTERISTICS: Exophytic T2 intermediate partial circumferential mass extending from the mid anal canal to involve the low rectum approximately 2 cm above the anorectal junction. Circumferentially spans from right anterolateral to left posterolateral (11:00-4:00).   INVASION OF SPHINCTER COMPLEX: Yes  If yes, please describe:  Invades internal sphincter (IS) and extends into intersphincteric space (ISS)  Broad-based tumor abutment of the puborectalis muscle which demonstrates nonspecific thinning.   T CATEGORY: T2: Tumor >2 cm and <5 cm in greatest dimension   FUNCTIONAL SEQUENCES: Restricted Diffusion.   REGIONAL LYMPH NODES (suspicious = irregular border, mixed signal intensity, round shape):   N0: No suspicious regional lymph node metastases   ADDITIONAL COMMENTS: Evidence of pelvic floor laxity with rectal descent below the pubococcygeal line. Degenerative changes of the left hip. Fluid distention of the left iliopsoas bursa and diffuse synovial enhancement about the left hip.  2/19/25 Ms Pacheco returns today for PTE she is now s/p RT to the Anus 30 Fxs to 54 Gy completed on 2/5/25.  She has a f/u with Dr Luna's NP on March 10th.  She is feeling better.  She reports she is still having mucous and a small amount of blood when passing stool, but this is getting less.  She also notes some slight burning with urination and nocturia x3.  She scales her pain in the rectal area at a 2 now.  Her appetite has been good and her weight stable.

## 2025-02-20 NOTE — REVIEW OF SYSTEMS
[Fatigue] : fatigue [Negative] : Heme/Lymph [Constipation] : no constipation [Diarrhea] : no diarrhea [de-identified] : hyperpigmentation in the groin area

## 2025-02-20 NOTE — PHYSICAL EXAM
[] : no respiratory distress [Exaggerated Use Of Accessory Muscles For Inspiration] : no accessory muscle use [Abdomen Soft] : soft [Nondistended] : nondistended [Abdomen Tenderness] : non-tender [Musculoskeletal - Swelling] : no joint swelling [Normal] : oriented to person, place and time, the affect was normal, the mood was normal and not anxious [FreeTextEntry1] : small external hemorrhoids now visible on the anterior anal verge; focal patches of moist desquamation significantly improved from prior [de-identified] : no genital edema or erythema [de-identified] : mild erythema and dry desquamation b/l groin; no palpable nodes

## 2025-02-20 NOTE — DISEASE MANAGEMENT
[FreeTextEntry4] : TBD - undergoing staging evaluation [TTNM] : 3 [NTNM] : 0 [MTNM] : 0 [de-identified] : 3248 [de-identified] : 9481 [de-identified] : anus/pelvis

## 2025-02-20 NOTE — DISEASE MANAGEMENT
[FreeTextEntry4] : TBD - undergoing staging evaluation [TTNM] : 3 [NTNM] : 0 [MTNM] : 0 [de-identified] : 5300 [de-identified] : 1684 [de-identified] : anus/pelvis

## 2025-02-24 NOTE — HISTORY OF PRESENT ILLNESS
[0 - No Distress] : Distress Level: 0 [ECOG Performance Status: 0 - Fully active, able to carry on all pre-disease performance without restriction] : Performance Status: 0 - Fully active, able to carry on all pre-disease performance without restriction [de-identified] : Ms. Pacheco is a 75-year-old female with a newly diagnosed rectal mass.  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 This has resulted in regular colonoscopic treatment evaluation. She reports her last colonoscopy was approximately 3 years ago. With the exam performed November 15, 2024 a 2.5 cm low rectal mass is diagnosed and biopsied. Pt reports over the past few months a feeling of tenesmus, anorectal pelvic floor achy pain which she has needed Tylenol for management. She also reports the appearance of bright red blood per rectum with bowel movements over these past several weeks/2 to 3 months. She denies any unintended weight loss.  Pt had a repeat bx yesterday, and is reporting some scant BRBPR w BM this AM.  Pt will be seeing Dr. Plaza for rad onc consultation later today. Pt also has CT CAP scheduled for Monday.   11/15/24 Colonoscopy with Dr. Murillo.   Report: - Polyp (0.5 cm) in the cecum and appendiceal orifice. (Polypectomy).  - Soft tissue mass 2.5CM, friabile Just w/i the rectum in the distal rectum. (Biopsy).  - Grade/Stage II internal hemorrhoids.  - Normal mucosa in the ascending colon, transverse colon, descending colon, sigmoid colon and rectum compatible with microscopic colitis. (Biopsy)   Pathology:  F. RECTUM, MASS, BIOPSY  - Rectal mucosa, partially denuded with ischemic changes, focal crypt rupture, and an increase in lamina propria fibrosis, suggestive of mucosal prolapse-type change  - Significant inflammation is not seen  - No dysplasia is seen  - Deeper levels were examined       Family cancer hx:  Lung cancer- mother Colon cancer- Pt reports that son Galdino, as a peds ER MD in [place]., and would be the primary [person to make decisions if she loses capacity, but that she expects that her other son, Oliver would also be involved in shared decision-making.,   Social Hx: Alcohol use (V49.89) (Z78.9)- occasssional Never a smoker No illicit drug use Retired from employment as    Current Meds Levothyroxine Sodium 75 MCG Oral Tablet; TAKE 1 TABLET BY MOUTH EVERY DAY Mercaptopurine 50 MG Oral Tablet; TAKE 2 TABLETS BY MOUTH EVERY DAY Mupirocin 2 % External Ointment Restasis 0.05 % Ophthalmic Emulsion; 1 drop both eyes Ophthalmic Twice a day (when remembers) Triamcinolone Acetonide 0.1 % External Cream  Health screenings: PAP smear n/a (pt s/p KING/BSO) Mammogram     [de-identified] : 2/24/25:   Pt done w 30 fractions of XRT to rectum.  Also completed xeloda.   Reports a small previously-open wound in rectal area, which has now scabbed over after the application of Silvadene.  Feels slightly more fatigued today, but reports that she did not sleep well last night 2/2 ongoing back pain/sciatica.  Normal BMs.  No other complaints.

## 2025-02-24 NOTE — ASSESSMENT
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 No EMVI. 3.9 cm CT C/A/P scheduled for 12/2- no metastatic disease. Small indeterminate liver lesion. will continue to monitor Case presented in [facility] Discussed utility of [employer]- ctDNA which is a useful tool for monitoring. Signed up for [employer] today   Reviewed treatment regimen with patient. Plan for Mitomycin/Xeloda Mitomycin 10 mg/m2 Day 1 and 29.  Xeloda daily with radiation: 825 mg/m2 BID on days of radiation started on 12/16. Now Day 28/30   Plan 2/24/25 >CBC, CMP. monitor counts as [facility] is myelosuppressive >WBC 3.25- not neutropenic >Per Dr. Easley, will get MRI in the next 2-3 months- pt made aware >RTC 1-2 weeks after MRI done- pt will call to make apmt after MRI done

## 2025-02-24 NOTE — HISTORY OF PRESENT ILLNESS
[0 - No Distress] : Distress Level: 0 [ECOG Performance Status: 0 - Fully active, able to carry on all pre-disease performance without restriction] : Performance Status: 0 - Fully active, able to carry on all pre-disease performance without restriction [de-identified] : Ms. Pacheco is a 75-year-old female with a newly diagnosed rectal mass.  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 This has resulted in regular colonoscopic treatment evaluation. She reports her last colonoscopy was approximately 3 years ago. With the exam performed November 15, 2024 a 2.5 cm low rectal mass is diagnosed and biopsied. Pt reports over the past few months a feeling of tenesmus, anorectal pelvic floor achy pain which she has needed Tylenol for management. She also reports the appearance of bright red blood per rectum with bowel movements over these past several weeks/2 to 3 months. She denies any unintended weight loss.  Pt had a repeat bx yesterday, and is reporting some scant BRBPR w BM this AM.  Pt will be seeing Dr. Plaza for rad onc consultation later today. Pt also has CT CAP scheduled for Monday.   11/15/24 Colonoscopy with Dr. Murillo.   Report: - Polyp (0.5 cm) in the cecum and appendiceal orifice. (Polypectomy).  - Soft tissue mass 2.5CM, friabile Just w/i the rectum in the distal rectum. (Biopsy).  - Grade/Stage II internal hemorrhoids.  - Normal mucosa in the ascending colon, transverse colon, descending colon, sigmoid colon and rectum compatible with microscopic colitis. (Biopsy)   Pathology:  F. RECTUM, MASS, BIOPSY  - Rectal mucosa, partially denuded with ischemic changes, focal crypt rupture, and an increase in lamina propria fibrosis, suggestive of mucosal prolapse-type change  - Significant inflammation is not seen  - No dysplasia is seen  - Deeper levels were examined       Family cancer hx:  Lung cancer- mother Colon cancer- Pt reports that son Galdino, as a peds ER MD in [place]., and would be the primary [person to make decisions if she loses capacity, but that she expects that her other son, Oliver would also be involved in shared decision-making.,   Social Hx: Alcohol use (V49.89) (Z78.9)- occasssional Never a smoker No illicit drug use Retired from employment as    Current Meds Levothyroxine Sodium 75 MCG Oral Tablet; TAKE 1 TABLET BY MOUTH EVERY DAY Mercaptopurine 50 MG Oral Tablet; TAKE 2 TABLETS BY MOUTH EVERY DAY Mupirocin 2 % External Ointment Restasis 0.05 % Ophthalmic Emulsion; 1 drop both eyes Ophthalmic Twice a day (when remembers) Triamcinolone Acetonide 0.1 % External Cream  Health screenings: PAP smear n/a (pt s/p KING/BSO) Mammogram     [de-identified] : 2/24/25:   Pt done w 30 fractions of XRT to rectum.  Also completed xeloda.   Reports a small previously-open wound in rectal area, which has now scabbed over after the application of Silvadene.  Feels slightly more fatigued today, but reports that she did not sleep well last night 2/2 ongoing back pain/sciatica.  Normal BMs.  No other complaints.

## 2025-02-24 NOTE — REVIEW OF SYSTEMS
[Shortness Of Breath] : shortness of breath [Diarrhea: Grade 0] : Diarrhea: Grade 0 [Negative] : Allergic/Immunologic [FreeTextEntry7] : Minor pain with bowel movements

## 2025-03-11 NOTE — PHYSICAL EXAM
[FreeTextEntry1] : Anorectal physical exam with digital rectal exam reveals a resolution of the previous exophytic mass located on the right anterior anal canal quadrant.  There is a smooth firmness at the base without ulceration or suspicious tissue breakdown.

## 2025-03-11 NOTE — ASSESSMENT
[FreeTextEntry1] : 75-year-old female now completed chemoradiation therapy for anal canal squamous cell carcinoma.  The patient is approximately 1 month out from completion of radiation therapy.  Office examination today is reassuring.  I have asked Josefa to return in 6 weeks for repeat office examination.  Indicated to her that the firmness palpated at the base of the previous exophytic mass may represent the ongoing effects of radiation therapy.  There is often a ongoing resolution of the firm tissue base that can be safely followed clinically over period of several weeks and months following radiation therapy, before any biopsies become necessary.

## 2025-03-11 NOTE — HISTORY OF PRESENT ILLNESS
[FreeTextEntry1] : Ms Pacheco is a 75 year old female who is here for a follow up on her anal cancer. Pathology of anorectal mass was Squamous cell carcinoma (12/2024). Pt is followed by Dr. Luna ([facility]) and Dr. Adame ([facility]). CRT completed 2/5/2025.  TNM Stage: c T 3 N 0 M 0 AJCC Stage (8th Ed): Currently not applicable. Completed Dose: 5400.  Treatment Site: anus/pelvis.  Josefa is approximately 1 month out from completion of radiation.  She reports significant resolution of the radiation associated perianal pain. No active bleeding at this time.

## 2025-03-11 NOTE — HISTORY OF PRESENT ILLNESS
[FreeTextEntry1] : Ms Pacheco is a 75 year old female who is here for a follow up on her anal cancer. Pathology of anorectal mass was Squamous cell carcinoma (12/2024). Pt is followed by Dr. Luna ([facility]) and Dr. Adame ([facility]). CRT completed 2/5/2025.  TNM Stage: c T 3 N 0 M 0 AJCC Stage (8th Ed): Currently not applicable. Completed Dose: 5400.  Treatment Site: anus/pelvis.  Josefa is approximately 1 month out from completion of radiation.  She reports significant resolution of the radiation associated perianal pain. No active bleeding at this time.

## 2025-04-09 NOTE — HISTORY OF PRESENT ILLNESS
[FreeTextEntry1] : Ms. Pacheco is a 75-year-old female diagnosed with anal cancer.  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 This has resulted in regular colonoscopic treatment evaluation. She reports her last colonoscopy was approximately 3 years ago.  Josefa reports over the past few months a feeling of tenesmus, anorectal pelvic floor achy pain which she has needed Tylenol for management. She also reports the appearance of bright red blood per rectum with bowel movements over these past several weeks/2 to 3 months. She denies any unintended weight loss. Recent blood work from September does not demonstrate anemia.  11/15/24 Colonoscopy with Dr. Murillo. Report: - Polyp (0.5 cm) in the cecum and appendiceal orifice. (Polypectomy). - Soft tissue mass 2.5CM, friabile Just w/i the rectum in the distal rectum. (Biopsy). - Grade/Stage II internal hemorrhoids. - Normal mucosa in the ascending colon, transverse colon, descending colon, sigmoid colon and rectum compatible with microscopic colitis. (Biopsy)  Pathology: FINAL DIAGNOSIS A. CECUM, POLYP, BIOPSY - Tubular adenoma B. RANDOM ASCENDING COLON, BIOPSY - Colonic mucosa with no significant pathologic abnormality C. RANDOM TRANSVERSE COLON, BIOPSY - Colonic mucosa with no significant pathologic abnormality D. RANDOM DESCENDING COLON, BIOPSY - Colonic mucosa with no significant pathologic abnormality E. RANDOM SIGMOID COLON, BIOPSY - Colonic mucosa with no pathologic abnormality F. RECTUM, MASS, BIOPSY - Rectal mucosa, partially denuded with ischemic changes, focal crypt rupture, and an increase in lamina propria fibrosis, suggestive of mucosal prolapse-type change - Significant inflammation is not seen - No dysplasia is seen - Deeper levels were examined - See comment  DIAGNOSIS COMMENT: - No evidence of malignancy is seen in part F. Features suggestive of prolapse-type change/solitary rectal ulcer syndrome are seen. However, it cannot be entirely excluded that this biopsy is not entirely representative of the lesion or that a deeper submucosal lesion is present which is not captured in this biopsy. Clinical and colonoscopic correlation are recommended. - No significant inflammation, dysplasia, or granulomas are seen in the random colon biopsies. - This case was reviewed intradepartmentally with a gastrointestinal subspecialty pathologist with agreement.  11/25/24 MRI Pelvis  11/26/24 repeat biopsy with Dr. Irving FINAL PATHOLOGIC DIAGNOSIS  A. RECTAL MASS BIOPSIES: -Invasive moderately differentiated squamous cell carcinoma, involving rectal mucosa, see Microscopic Description   11/27/24 Patient presents today for consultation for radiation.  1/10/25  MRI of pelvis PROCEDURE:  MR of the pelvis was performed as per Anal Cancer Protocol including thin section T2 weighted imaging.  Rectal Gel: 60 cc rectal gel administered.   FINDINGS:   TUMOR SIZE: 3.9 cm x 2.5 cm   TUMOR CHARACTERISTICS: Exophytic T2 intermediate partial circumferential mass extending from the mid anal canal to involve the low rectum approximately 2 cm above the anorectal junction. Circumferentially spans from right anterolateral to left posterolateral (11:00-4:00).   INVASION OF SPHINCTER COMPLEX: Yes  If yes, please describe:  Invades internal sphincter (IS) and extends into intersphincteric space (ISS)  Broad-based tumor abutment of the puborectalis muscle which demonstrates nonspecific thinning.   T CATEGORY: T2: Tumor >2 cm and <5 cm in greatest dimension   FUNCTIONAL SEQUENCES: Restricted Diffusion.   REGIONAL LYMPH NODES (suspicious = irregular border, mixed signal intensity, round shape):   N0: No suspicious regional lymph node metastases   ADDITIONAL COMMENTS: Evidence of pelvic floor laxity with rectal descent below the pubococcygeal line. Degenerative changes of the left hip. Fluid distention of the left iliopsoas bursa and diffuse synovial enhancement about the left hip.  2/19/25 Ms Pacheco returns today for PTE she is now s/p RT to the Anus 30 Fxs to 54 Gy completed on 2/5/25.  She has a f/u with Dr Luna's NP on March 10th.  She is feeling better.  She reports she is still having mucous and a small amount of blood when passing stool, but this is getting less.  She also notes some slight burning with urination and nocturia x3.  She scales her pain in the rectal area at a 2 now.  Her appetite has been good and her weight stable.    4/9/2025 Ms Pacheco returns today for a six week follow up with [facility]. She saw Dr Irving on 3/10/25 he noted: office examination:  the firmness palpated at the base of the previous exophytic mass may represent the ongoing effects of radiation therapy. There is often a ongoing resolution of the firm tissue base that can be safely followed clinically over period of several weeks and months following radiation therapy, before any biopsies become necessary.

## 2025-04-09 NOTE — DISEASE MANAGEMENT
[Clinical] : TNM Stage: c [TTNM] : 3 [NTNM] : 0 [MTNM] : 0 [N/A] : Currently not applicable [de-identified] : 3243 [de-identified] : 3214 [de-identified] : anus/pelvis

## 2025-04-16 NOTE — DISEASE MANAGEMENT
[FreeTextEntry4] : TBD - undergoing staging evaluation [TTNM] : 3 [NTNM] : 0 [MTNM] : 0 [de-identified] : 7795 [de-identified] : 8791 [de-identified] : anus/pelvis stroke

## 2025-04-21 NOTE — PHYSICAL EXAM
[FreeTextEntry1] : Anorectal examination with visual, digital rectal exam, anoscopic exam is without clinical evidence of persistence or recurrence. The original tumor was located in the right posterior right lateral anal canal and this area is without erosive lesion or mass.  There is some subtle knobby-ness to the digital examination, smooth nontender tissue, consistent with post chemoradiation therapy.

## 2025-04-21 NOTE — HISTORY OF PRESENT ILLNESS
[FreeTextEntry1] : Ms Josefa Pacheco is a 75 year old female who is here for a follow up on her anal cancer. Pathology of anorectal mass was Squamous cell carcinoma (12/2024). Pt is followed by Dr. Luna ([facility]) and Dr. Adame ([facility]). CRT completed 2/5/2025. TNM Stage: c T 3 N 0 M 0 AJCC Stage (8th Ed): Currently not applicable. Completed Dose: 5400. Treatment Site: anus/pelvis.  Josefa denies any issues of pain or bleeding since the 2 months completion of radiation.  Her bowel movements are much more comfortable.  She is not having any issues of incontinence.    Patient is also under the [facility] ctDNA surveillance program with post treatment values, most recent March 24, 2025 :  0.00

## 2025-04-21 NOTE — HISTORY OF PRESENT ILLNESS
[FreeTextEntry1] : Ms Josefa Pacheco is a 75 year old female who is here for a follow up on her anal cancer. Pathology of anorectal mass was Squamous cell carcinoma (12/2024). Pt is followed by Dr. Luna ([facility]) and Dr. Adame ([facility]). CRT completed 2/5/2025. TNM Stage: c T 3 N 0 M 0 AJCC Stage (8th Ed): Currently not applicable. Completed Dose: 5400. Treatment Site: anus/pelvis.  Josefa denies any issues of pain or bleeding since the 2 months completion of radiation.  Her bowel movements are much more comfortable.  She is not having any issues of incontinence.    Patient is also under the [facility] ctDNA surveillance program with post treatment values, most recent March 24, 2025 :  0.00

## 2025-04-21 NOTE — ASSESSMENT
[FreeTextEntry1] : Josefa Pacheco 75-year-old female now completed radiation chemotherapy management for anal squamous cell carcinoma.  At today's examination, 2 months following completion of radiation therapy, Josefa is without evidence of persistence or recurrence.  She has additional appointments with Dr. Luna and Neil which will include appropriate radiology surveillance monitoring.  I would like to see Josefa back in 2 to 3 months for office examination.  Repeat  surveillance colonoscpy with Dr Murillo recommended end of 2025  Continue the [employer] ctDNA program.

## 2025-04-21 NOTE — CONSULT LETTER
[Please see my note below.] : Please see my note below. [Consult Closing:] : Thank you very much for allowing me to participate in the care of this patient.  If you have any questions, please do not hesitate to contact me. [Sincerely,] : Sincerely, [FreeTextEntry2] : MD Kd Barnes MD Crystal Antoine, MD Leila Thcelebi, MD [FreeTextEntry3] : Albaro Irving MD FASCRS

## 2025-04-22 NOTE — REVIEW OF SYSTEMS
[Negative] : Heme/Lymph [FreeTextEntry9] : hip tightness [FreeTextEntry1] : facial itching [Constipation: Grade 0] : Constipation: Grade 0 [Diarrhea: Grade 0] : Diarrhea: Grade 0 [Proctitis: Grade 0] : Proctitis: Grade 0 [Rectal Pain: Grade 0] : Rectal Pain: Grade 0 [Urinary Incontinence: Grade 0] : Urinary Incontinence: Grade 0  [Urinary Tract Pain: Grade 0] : Urinary Tract Pain: Grade 0 [Urinary Urgency: Grade 0] : Urinary Urgency: Grade 0 [Urinary Frequency: Grade 0] : Urinary Frequency: Grade 0 [Genital Edema: Grade 0] : Genital Edema: Grade 0 [Vaginal Stricture: Grade 0] : Vaginal Stricture: Grade 0 [Vaginal Infection: Grade 0] : Vaginal Infection: Grade 0  [Dyspareunia: Grade 0] : Dyspareunia: Grade 0 [Dermatitis Radiation: Grade 0] : Dermatitis Radiation: Grade 0

## 2025-04-22 NOTE — PHYSICAL EXAM
[] : no respiratory distress [Respiration, Rhythm And Depth] : normal respiratory rhythm and effort [Exaggerated Use Of Accessory Muscles For Inspiration] : no accessory muscle use [Abdomen Soft] : soft [Nondistended] : nondistended [Abdomen Tenderness] : non-tender [Blood on examination glove] : no blood on examination glove [Normal External Genitalia] : normal external genitalia  [Normal Vagina] : normal vagina without lesions or masses [Inguinal Lymph Nodes Enlarged Bilaterally] : inguinal [Normal] : oriented to person, place and time, the affect was normal, the mood was normal and not anxious [de-identified] : rosacea [FreeTextEntry1] : Small residual nodularity at site of the tumor about 2cm from the anal verge along the posterior and right lateral anorectal wall; intact sphincter tone [de-identified] : no palpable vaginal lesions; smooth vaginal cuff; no e/o post-RT vaginal stenosis [de-identified] : perianal telangiectasias but no desquamation noted

## 2025-04-22 NOTE — HISTORY OF PRESENT ILLNESS
[FreeTextEntry1] : Ms. Pacheco is a 75-year-old female diagnosed with anal cancer.  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 This has resulted in regular colonoscopic treatment evaluation. She reports her last colonoscopy was approximately 3 years ago.  Josefa reports over the past few months a feeling of tenesmus, anorectal pelvic floor achy pain which she has needed Tylenol for management. She also reports the appearance of bright red blood per rectum with bowel movements over these past several weeks/2 to 3 months. She denies any unintended weight loss. Recent blood work from September does not demonstrate anemia.  11/15/24 Colonoscopy with Dr. Murillo. Report: - Polyp (0.5 cm) in the cecum and appendiceal orifice. (Polypectomy). - Soft tissue mass 2.5CM, friabile Just w/i the rectum in the distal rectum. (Biopsy). - Grade/Stage II internal hemorrhoids. - Normal mucosa in the ascending colon, transverse colon, descending colon, sigmoid colon and rectum compatible with microscopic colitis. (Biopsy)  Pathology: FINAL DIAGNOSIS A. CECUM, POLYP, BIOPSY - Tubular adenoma B. RANDOM ASCENDING COLON, BIOPSY - Colonic mucosa with no significant pathologic abnormality C. RANDOM TRANSVERSE COLON, BIOPSY - Colonic mucosa with no significant pathologic abnormality D. RANDOM DESCENDING COLON, BIOPSY - Colonic mucosa with no significant pathologic abnormality E. RANDOM SIGMOID COLON, BIOPSY - Colonic mucosa with no pathologic abnormality F. RECTUM, MASS, BIOPSY - Rectal mucosa, partially denuded with ischemic changes, focal crypt rupture, and an increase in lamina propria fibrosis, suggestive of mucosal prolapse-type change - Significant inflammation is not seen - No dysplasia is seen - Deeper levels were examined - See comment  DIAGNOSIS COMMENT: - No evidence of malignancy is seen in part F. Features suggestive of prolapse-type change/solitary rectal ulcer syndrome are seen. However, it cannot be entirely excluded that this biopsy is not entirely representative of the lesion or that a deeper submucosal lesion is present which is not captured in this biopsy. Clinical and colonoscopic correlation are recommended. - No significant inflammation, dysplasia, or granulomas are seen in the random colon biopsies. - This case was reviewed intradepartmentally with a gastrointestinal subspecialty pathologist with agreement.  11/25/24 MRI Pelvis  11/26/24 repeat biopsy with Dr. Irving FINAL PATHOLOGIC DIAGNOSIS  A. RECTAL MASS BIOPSIES: -Invasive moderately differentiated squamous cell carcinoma, involving rectal mucosa, see Microscopic Description   11/27/24 Patient presents today for consultation for radiation.  1/10/25  MRI of pelvis PROCEDURE:  MR of the pelvis was performed as per Anal Cancer Protocol including thin section T2 weighted imaging.  Rectal Gel: 60 cc rectal gel administered.   FINDINGS:   TUMOR SIZE: 3.9 cm x 2.5 cm   TUMOR CHARACTERISTICS: Exophytic T2 intermediate partial circumferential mass extending from the mid anal canal to involve the low rectum approximately 2 cm above the anorectal junction. Circumferentially spans from right anterolateral to left posterolateral (11:00-4:00).   INVASION OF SPHINCTER COMPLEX: Yes  If yes, please describe:  Invades internal sphincter (IS) and extends into intersphincteric space (ISS)  Broad-based tumor abutment of the puborectalis muscle which demonstrates nonspecific thinning.   T CATEGORY: T2: Tumor >2 cm and <5 cm in greatest dimension   FUNCTIONAL SEQUENCES: Restricted Diffusion.   REGIONAL LYMPH NODES (suspicious = irregular border, mixed signal intensity, round shape):   N0: No suspicious regional lymph node metastases   ADDITIONAL COMMENTS: Evidence of pelvic floor laxity with rectal descent below the pubococcygeal line. Degenerative changes of the left hip. Fluid distention of the left iliopsoas bursa and diffuse synovial enhancement about the left hip.  2/19/25 Ms Pacheco returns today for PTE she is now s/p RT to the Anus 30 Fxs to 54 Gy completed on 2/5/25.  She has a f/u with Dr Luna's NP on March 10th.  She is feeling better.  She reports she is still having mucous and a small amount of blood when passing stool, but this is getting less.  She also notes some slight burning with urination and nocturia x3.  She scales her pain in the rectal area at a 2 now.  Her appetite has been good and her weight stable.    4/21/2025 Ms Pacheco returns today for a f-up with TOR. She saw Dr Irving on 3/10/25 he noted: office examination:  the firmness palpated at the base of the previous exophytic mass may represent the ongoing effects of radiation therapy. There is often a ongoing resolution of the firm tissue base that can be safely followed clinically over period of several weeks and months following radiation therapy, before any biopsies become necessary. She also saw Dr Irving today who stated all was looking good Today she is feeling well.  Her bowels are normal and has no difficulty with urination.  She does note that her hips feel very tight and she has some itching in her face for which she will try OTC antihistamine.

## 2025-04-22 NOTE — DISEASE MANAGEMENT
[TTNM] : 3 [NTNM] : 0 [MTNM] : 0 [de-identified] : 7177 [de-identified] : 5080 [de-identified] : anus/pelvis completed on 2/5/25

## 2025-04-22 NOTE — DISEASE MANAGEMENT
[TTNM] : 3 [NTNM] : 0 [MTNM] : 0 [de-identified] : 7610 [de-identified] : 2629 [de-identified] : anus/pelvis completed on 2/5/25

## 2025-04-22 NOTE — PHYSICAL EXAM
[] : no respiratory distress [Respiration, Rhythm And Depth] : normal respiratory rhythm and effort [Exaggerated Use Of Accessory Muscles For Inspiration] : no accessory muscle use [Abdomen Soft] : soft [Nondistended] : nondistended [Abdomen Tenderness] : non-tender [Blood on examination glove] : no blood on examination glove [Normal External Genitalia] : normal external genitalia  [Normal Vagina] : normal vagina without lesions or masses [Inguinal Lymph Nodes Enlarged Bilaterally] : inguinal [Normal] : oriented to person, place and time, the affect was normal, the mood was normal and not anxious [de-identified] : rosacea [FreeTextEntry1] : Small residual nodularity at site of the tumor about 2cm from the anal verge along the posterior and right lateral anorectal wall; intact sphincter tone [de-identified] : no palpable vaginal lesions; smooth vaginal cuff; no e/o post-RT vaginal stenosis [de-identified] : perianal telangiectasias but no desquamation noted

## 2025-04-22 NOTE — PHYSICAL EXAM
[] : no respiratory distress [Respiration, Rhythm And Depth] : normal respiratory rhythm and effort [Exaggerated Use Of Accessory Muscles For Inspiration] : no accessory muscle use [Abdomen Soft] : soft [Nondistended] : nondistended [Abdomen Tenderness] : non-tender [Blood on examination glove] : no blood on examination glove [Normal External Genitalia] : normal external genitalia  [Normal Vagina] : normal vagina without lesions or masses [Inguinal Lymph Nodes Enlarged Bilaterally] : inguinal [Normal] : oriented to person, place and time, the affect was normal, the mood was normal and not anxious [de-identified] : rosacea [FreeTextEntry1] : Small residual nodularity at site of the tumor about 2cm from the anal verge along the posterior and right lateral anorectal wall; intact sphincter tone [de-identified] : no palpable vaginal lesions; smooth vaginal cuff; no e/o post-RT vaginal stenosis [de-identified] : perianal telangiectasias but no desquamation noted

## 2025-04-22 NOTE — DISEASE MANAGEMENT
[TTNM] : 3 [NTNM] : 0 [MTNM] : 0 [de-identified] : 3370 [de-identified] : 7623 [de-identified] : anus/pelvis completed on 2/5/25

## 2025-05-05 NOTE — PLAN
[FreeTextEntry1] : Check labs Return as needed Will order thyroid med dose tomorrow after results return.

## 2025-05-05 NOTE — ASSESSMENT
[FreeTextEntry1] : hypothyroidism- Clinically euthyroid. Continue medication and adjust her TSH. Last TSH at goal 3.80 on half a tablet of 75 mcg daily.  If TSH too low, will decrease med to 50 mcg daily followed by 25 mcg daily.  Elevated MCV- I discussed the chronic elevated MCV which is likely a result of her chronic alcohol intake and her 6MP med. Discussed decreasing her alcohol intake which she has. Last MCV was 108.2  Lymphocytic colitis- continue follow-up and meds as per GI Dr. Murillo.   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 She has completed her treatments. She appears to be doing well however I am concerned with her CEA rising.  Strongly recommend she continue follow-up with GI.

## 2025-05-05 NOTE — HISTORY OF PRESENT ILLNESS
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 Symptoms consistent with hypothyroidism/hyperthyroidism are ABSENT.  The symptoms consistent with hyperthyroidism are ABSENT.  Side effects of the medications include NONE. Compliance with medical regimen has been GOOD.

## 2025-05-05 NOTE — REVIEW OF SYSTEMS
[Fatigue] : fatigue [Hair Changes] : hair changes [Chest Pain] : no chest pain [Palpitations] : no palpitations [Shortness Of Breath] : no shortness of breath [Nail Changes] : no nail changes

## 2025-05-05 NOTE — PHYSICAL EXAM
[No JVD] : no jugular venous distention [Normal] : no respiratory distress, lungs were clear to auscultation bilaterally and no accessory muscle use [Normal Rate] : normal rate  [Regular Rhythm] : with a regular rhythm [Normal S1, S2] : normal S1 and S2 [No Edema] : there was no peripheral edema [de-identified] : 1/6 MAGGIE at [facility] radiation [de-identified] : No tremor or lid lag

## 2025-06-03 NOTE — REVIEW OF SYSTEMS
[Constipation: Grade 0] : Constipation: Grade 0 [Diarrhea: Grade 1 - Increase of <4 stools per day over baseline; mild increase in ostomy output compared to baseline] : Diarrhea: Grade 1 - Increase of <4 stools per day over baseline; mild increase in ostomy output compared to baseline [Rectal Pain: Grade 0] : Rectal Pain: Grade 0 [Fatigue: Grade 0] : Fatigue: Grade 0 [Diarrhea] : diarrhea [Negative] : Allergic/Immunologic

## 2025-06-03 NOTE — PHYSICAL EXAM
[Thin] : thin [] : no respiratory distress [Respiration, Rhythm And Depth] : normal respiratory rhythm and effort [Exaggerated Use Of Accessory Muscles For Inspiration] : no accessory muscle use [Abdomen Soft] : soft [Nondistended] : nondistended [Abdomen Tenderness] : non-tender [Normal External Genitalia] : normal external genitalia  [Normal Vagina] : normal vagina without lesions or masses [Inguinal Lymph Nodes Enlarged Bilaterally] : inguinal [Normal] : oriented to person, place and time, the affect was normal, the mood was normal and not anxious [FreeTextEntry1] : Exam performed w/ RN Arnoldo; small external hemorrhoids; intact sphincter tone; there is thickness of the posterior anal canal wall c/w post-treatment scarring w/o e/o palpable tumor [de-identified] : telangiectasias/erythema around the anus

## 2025-06-03 NOTE — HISTORY OF PRESENT ILLNESS
[FreeTextEntry1] : Ms. Pacheco is a 75F w/ cT3N0 anal cancer s/p CRT completed 2/5/25. Patient has had a good clinical response to treatment. There remains some residual nodularity at the former site of the anal mass. The patient does not have an appointment scheduled to see Dr. Luna.   MRI of pelvis at [place] on 5/28/25 showed: MPRESSION: Since the prior examination of 11/25/2024, the primary tumor shows: Near complete response, possible residual tumor. Advise correlation with direct visualization/tissue sampling.  She states that overall, she is feeling well. She denies any pain or discomfort. Her stools are more like diarrhea, loose or chunky but never feels like she fully has a bowel movement. She is in search of a new GI as her current GI is out on leave. She was given a dilator at her last visit but has not used it. Instructions were reviewed with the patient.

## 2025-06-03 NOTE — PHYSICAL EXAM
[Thin] : thin [] : no respiratory distress [Respiration, Rhythm And Depth] : normal respiratory rhythm and effort [Exaggerated Use Of Accessory Muscles For Inspiration] : no accessory muscle use [Abdomen Soft] : soft [Nondistended] : nondistended [Abdomen Tenderness] : non-tender [Normal External Genitalia] : normal external genitalia  [Normal Vagina] : normal vagina without lesions or masses [Inguinal Lymph Nodes Enlarged Bilaterally] : inguinal [Normal] : oriented to person, place and time, the affect was normal, the mood was normal and not anxious [FreeTextEntry1] : Exam performed w/ RN Arnoldo; small external hemorrhoids; intact sphincter tone; there is thickness of the posterior anal canal wall c/w post-treatment scarring w/o e/o palpable tumor [de-identified] : telangiectasias/erythema around the anus

## 2025-06-03 NOTE — DISEASE MANAGEMENT
[Clinical] : TNM Stage: c [N/A] : Currently not applicable [TTNM] : 3 [NTNM] : 0 [MTNM] : 0 [de-identified] : 3441 [de-identified] : 8753 [de-identified] : anus/pelvis completed on 2/5/25

## 2025-06-03 NOTE — DISEASE MANAGEMENT
[Clinical] : TNM Stage: c [N/A] : Currently not applicable [TTNM] : 3 [NTNM] : 0 [MTNM] : 0 [de-identified] : 3810 [de-identified] : 2999 [de-identified] : anus/pelvis completed on 2/5/25

## 2025-06-03 NOTE — HISTORY OF PRESENT ILLNESS
[FreeTextEntry1] : Ms. Pacheco is a 75F w/ cT3N0 anal cancer s/p CRT completed 2/5/25. Patient has had a good clinical response to treatment. There remains some residual nodularity at the former site of the anal mass. The patient does not have an appointment scheduled to see Dr. Luna.   MRI of pelvis at [place] on 5/28/25 showed: MPRESSION: Since the prior examination of 11/25/2024, the primary tumor shows: Near complete response, possible residual tumor. Advise correlation with direct visualization/tissue sampling.  She states that overall, she is feeling well. She denies any pain or discomfort. Her stools are more like diarrhea, loose or chunky but never feels like she fully has a bowel movement. She is in search of a new GI as her current GI is out on leave. She was given a dilator at her last visit but has not used it. Instructions were reviewed with the patient.

## 2025-06-03 NOTE — HISTORY OF PRESENT ILLNESS
[FreeTextEntry1] : Ms. Pacheco is a 75F w/ cT3N0 anal cancer s/p CRT completed 2/5/25. Patient has had a good clinical response to treatment. There remains some residual nodularity at the former site of the anal mass. The patient does not have an appointment scheduled to see Dr. Luna.   MRI of pelvis at [place] on 5/28/25 showed: MPRESSION: Since the prior examination of 11/25/2024, the primary tumor shows: Near complete response, possible residual tumor. Advise correlation with direct visualization/tissue sampling.  She states that overall, she is feeling well. She denies any pain or discomfort. Her stools are more like diarrhea, loose or chunky but never feels like she fully has a bowel movement. She is in search of a new GI as her current GI is out on leave. She was given a dilator at her last visit but has not used it. Instructions were reviewed with the patient.

## 2025-06-03 NOTE — DISEASE MANAGEMENT
[Clinical] : TNM Stage: c [N/A] : Currently not applicable [TTNM] : 3 [NTNM] : 0 [MTNM] : 0 [de-identified] : 8224 [de-identified] : 1580 [de-identified] : anus/pelvis completed on 2/5/25

## 2025-06-03 NOTE — PHYSICAL EXAM
[Thin] : thin [] : no respiratory distress [Respiration, Rhythm And Depth] : normal respiratory rhythm and effort [Exaggerated Use Of Accessory Muscles For Inspiration] : no accessory muscle use [Abdomen Soft] : soft [Nondistended] : nondistended [Abdomen Tenderness] : non-tender [Normal External Genitalia] : normal external genitalia  [Normal Vagina] : normal vagina without lesions or masses [Inguinal Lymph Nodes Enlarged Bilaterally] : inguinal [Normal] : oriented to person, place and time, the affect was normal, the mood was normal and not anxious [FreeTextEntry1] : Exam performed w/ RN Arnoldo; small external hemorrhoids; intact sphincter tone; there is thickness of the posterior anal canal wall c/w post-treatment scarring w/o e/o palpable tumor [de-identified] : telangiectasias/erythema around the anus

## 2025-06-13 NOTE — HISTORY OF PRESENT ILLNESS
[de-identified] : Patient comes in with more than 6 months of right knee pain atraumatic in onset.  Patient has tried greater than 6 months of nsaids, physical therapy and doctor directed exercises and injections.  Patient continues to have pain that is 8/10 in severity and interferes with activities of daily living such as putting on shoes and socks, walking two blocks, and getting up and down from a chair and toilet.  Knee osteoarthritis outcome score is 8.1   also having some left hip pain occ in groin has done pt and lumbar injection without help

## 2025-06-13 NOTE — DISCUSSION/SUMMARY
[de-identified] : right knee will get visco injections as has tried all others  left hip will send for intra articular shot

## 2025-06-13 NOTE — PHYSICAL EXAM
[de-identified] : Knee ranges 5-115 degrees with pain and crepitus stable to varus, valgus, anterior and posterior stress neurovascularly intact distally no pain with hip range of motion negative straight leg raise no effusion, synovitis, or edema or erythema skin intact   left hip painful restricted rom nvid [de-identified] : moderate oa left hip

## 2025-06-26 NOTE — PROCEDURE
[de-identified] : A pre-procedure verification was performed by asking the patient to state their name, , and the location of the procedure being performed in their own words.  A review of allergies was accomplished through dialog and the patient, ending thus in the full agreement. The risks and benefits were explained and consent were obtained verbally.  Procedure: Ultrasound Guided Intraarticular Injection >>  Hip, Left Consent was obtained. Patients questions were answered to the best of my ability prior to procedure. Injection site and surrounding bony landmarks were palpated, and marked prior to proceeding. Target location and needle pathway identified under ultrasound. Ultrasound probe was sterilized in the typical fashion prior to application of sterile gel. Injection site was cleaned and prepped in the typical fashion using alcohol swabs. Cold spray used on skin for patient comfort. 3cc Lidocaine w/o Epi was used to anesthetize the needle path. Injection site and ultrasound probe we re-cleaned/sterilized and re-prepped in the typical fashion while local anesthesia took effect. Sterile gel was applied and site was identified once again with ultrasound. Needle was advanced into the intraarticular space from the anterior approach. A combination of 80mg Depomedrol and 3cc Marcaine w/o Epi was then injected into the joint. Patient tolerated the procedure well, without significant complications. Minimal (<3cc) blood loss experienced. Images were saved into patient's chart.

## 2025-06-26 NOTE — HISTORY OF PRESENT ILLNESS
[de-identified] : 6/25/2025: presenting for evaluation of L hip pain. She was seen by Dr. Camacho and diagnosed with L hip osteoarthritis. She notes pain into the L groin. She has pain with positional changes, walking. No injuries.

## 2025-06-26 NOTE — PHYSICAL EXAM
[de-identified] : L hip: - No obvious deformity or swelling - No tenderness to palpation of greater trochanter - No pain with full flexion, internal/external rotation - 5/5 strength hip flexion, abduction, adduction. Pain with hip flexion - Negative JOSE L  - Positive FADIR - Negative Log roll - Distally neurovascularly intact   R hip: - No obvious deformity or swelling - No tenderness to palpation of greater trochanter - No pain with full flexion, internal/external rotation - 5/5 strength hip flexion, abduction, adduction - Negative JOSE L, FADIR - Distally neurovascularly intact

## 2025-06-26 NOTE — ASSESSMENT
[FreeTextEntry1] : L hip and groin pain for last several months with exam and XR consistent with OA - L US guided hip CSI given - tylenol, ice as needed - Return to activities as tolerated - Follow up as needed

## 2025-07-02 NOTE — PHYSICAL EXAM
[de-identified] : left hip with some restriction in rom and mild pain with rom most her pain is in lower back pain free slr nvid

## 2025-07-02 NOTE — PROCEDURE
[de-identified] : after discussion we decided to do a hyaluronic acid injection.  risks, benefits and alternatives discussed, consent obtained.  under sterile conditions using the anterolateral portal I injected 2cc of hyaluronic acid into the knee.  patient tolerated procedure well and will fu as scheduled or as needed.  right knee euflexxa

## 2025-07-02 NOTE — DISCUSSION/SUMMARY
[de-identified] : referred to pain amangement for possible back pain dont want to rush into left nba until we rule that out especially since she needs her right knee done

## 2025-07-07 NOTE — PHYSICAL EXAM
[FreeTextEntry1] : Anorectal examination including visual, digital rectal exam, anoscopic exam is without clinical suspicion of persistent cancer.  The right posterior, posterior anal canal has a smooth fibrotic step-off consistent with post chemoradiation effect to the site of primary tumor.  There is no exophytic mass there is no ulcerating lesion on today's examination.

## 2025-07-07 NOTE — HISTORY OF PRESENT ILLNESS
[FreeTextEntry1] : Ms. Pacheco is a 75 year old female with cT3N0 anal cancer s/p CRT completed 2/5/25, here for a surveillance exam. Last exam was April 2025. Follows Dr. Luna ([facility]) and Dr. Adame ([facility]).   Today Josefa reports no issues of anorectal pain, or palpable mass/lump. No bleeding with BM.  She does rpt weight loss. She is not currently on chemotherapy.    MRI Pelv Rect Anal 5/28/25 IMPRESSION:  Since the prior examination of 11/25/2024, the primary tumor shows: Near complete response, possible residual tumor. Advise correlation with direct visualization/tissue sampling.  ctDNA 6/13/25 0.06 3/24/25 0.00  Pt recommended to have repeat surveillance colonoscopy end of 2025 with Dr. Murillo.

## 2025-07-07 NOTE — ASSESSMENT
[FreeTextEntry1] : Josefa Pacheco 75-year-old now status post radiation chemotherapy for anal squamous cell carcinoma.  On today's examination there is no clinical evidence of persistence or recurrence.  Recommend repeat colonoscopic evaluation in November or December. Recommend repeat office examination with myself in 3 months Will continue to follow the [employer] CT DNA monitoring, currently at a 3-month interval.  Josefa reports an ongoing plan with Dr. Adame's office for repeat radiology imaging in August.

## 2025-07-07 NOTE — PROCEDURE
[FreeTextEntry1] :  Anoscopy is a surveillance procedure that involves the insertion of a specialized tube, called an anoscope, into the lower anorectum to visualize the anal canal and lower rectum. By providing direct visualization, anoscopy aids in the accurate diagnosis of various conditions such as hemorrhoids, anal fissures, and certain infections. It is also a necessary part of post treatment clinical surveillance of pt hisptory anorectal squamous cell carcinoma. Verbal consent obtained with patient prior to office exam today.

## 2025-07-09 NOTE — DISCUSSION/SUMMARY
[de-identified] : rx for pt sent told her to let me know how it goes with dr saravia and trista after he is done with her

## 2025-07-09 NOTE — HISTORY OF PRESENT ILLNESS
[de-identified] : knee injections working well left hip is relatively unchanged has appointment with Dr Holm tomorrow asked for physcial therapy rx

## 2025-07-09 NOTE — PROCEDURE
[de-identified] : after discussion we decided to do a hyaluronic acid injection.  risks, benefits and alternatives discussed, consent obtained.  under sterile conditions using the anterolateral portal I injected 2cc of hyaluronic acid into the knee.  patient tolerated procedure well and will fu as scheduled or as needed.  right knee euflexxa #3

## 2025-07-10 NOTE — ASSESSMENT
[FreeTextEntry1] : >> Imaging and Other Studies   I personally reviewed the relevant imaging.  Discussed and explained to patient the likely source of pathology and pain.  Questions answered.  back and leg pain likely secondary to lumbar radiculopathy and discogenic pain refractory to conservative treatments including 6 consecutive weeks of home exercises/PT, will obtain MRI LS to evaluate for pathology  may consider PT vs intervention pending eval >> Therapy and Other Modalities  PT  >> Medications  acetaminophen 650mg q8h prn pain (caution <3g daily) >> Interventions   SIgnificant pain maladaptive, with + SIJ provocative tests and refractory to conservative treatments.  Will schedule LEFT SIJ steroid injection r/b/a discussed >> Consults   >> Discussion of Risks/Benefits/Alternatives    >Regarding any scheduled procedures:   In the event the patient is scheduled for a procedure,   I have discussed in detail with the patient that any interventional pain procedure is associated with potential risks.  The procedure may include an injection of steroids and potentially other medications (local anesthetic and normal saline) into the epidural space or surrounding tissue of the spine.  There are significant risks of this procedure which include and are not limited to infection, bleeding, worsening pain, dural puncture leading to postdural puncture headache, nerve damage, spinal cord injury, paralysis, stroke, and death.   There is a chance that the procedure does not improve their pain.   There are risks associated with the steroid being absorbed into the body systemically.  These include dysphoria, difficulty sleeping, mood swings and personality changes.  Premenopausal women may notice an irregularity in her menstrual cycle for 2-3 months following the injection.  Steroids can specifically affect patients with hypertension, diabetes, and peptic ulcers.  The procedure may cause a temporary increase in blood pressure and blood pressure, and may adversely affect a peptic ulcer.  Other, more rare complications, include avascular necrosis of joints, glaucoma and worsening of osteoporosis.   I have discussed the risks of the procedure at length with the patient, and the potential benefits of pain relief.  I have offered alternatives to the procedure.  All questions were answered.   The patient expressed understanding and wishes to proceed with the procedure.   > Longitudinal management of Complex Painful condition   The patient is being managed for a complex condition that requires ongoing management.  The nature of this condition demands nuanced approach to treatment.  The seriousness of the condition necessitates an in-depth and focused approach to management and coordination with other healthcare professionals.    This visit involves intricate evaluation and management of the patient's condition.  The complexity of the visit was due to the need for detailed assessment of the current state, consideration of potential complications and a careful balancing of treatment options to management the chronic condition effectively.   As detailed above, the patient has a chronic significant painful condition that requires regular and detailed management.  The condition's impact on the patient's quality of life and health is substantial and necessitates a comprehensive and tailored approach   >> Conclusion   There were no barriers to communication. Informed patient that I would be available for any additional questions. Patient was instructed to call with any worsening symptoms including severe pain, new numbness/weakness, or changes in the bowel/bladder function. Discussed role of nsaids in pain management and all relevant risks, if patient is continuing to require after 4 weeks the patient should f/u for alternative treatment. Instructed patient to maintain pain diary to monitor pain level, mobility, and function.   The referring provider was informed of the above diagnosis and treatment plan.

## 2025-07-10 NOTE — PHYSICAL EXAM
[Normal] : Well developed, in no acute distress, alert and oriented to person, place and time [Normal muscle bulk without asymmetry] : normal muscle bulk without asymmetry [Facet Tenderness] : facet tenderness [Spine: Flexion to ___ degrees, without pain] : spine: flexion to [unfilled] degrees, without pain [JOSE L Test] : positive JOSE L Test (Floyd's Test) [SI Provacative Testing] : positive SI Provacative Testing [] : Motor:

## 2025-07-10 NOTE — HISTORY OF PRESENT ILLNESS
[Joint Pain] : joint  [___ yrs] : [unfilled] year(s) ago [Constant] : constant [7] : a minimum pain level of 7/10 [8] : a maximum pain level of 8/10 [Sharp] : sharp [Aching] : aching [Stabbing] : stabbing [Sitting] : sitting [Medications] : medications [Other: ___] : [unfilled] [FreeTextEntry1] : HPI     Ms. LAKISHA RUFFIN is a 75 year F with pmhx of rectal colon CA (chemo, radiation- Dr Jack, Dr Adame), hypothyroid, right knee OA (s/p orthovisc). Complains of left lower back and buttock pain despite physical therapy and medicines. Pain is impacting her quality of life and ability to exercise. Denies any additional weakness, numbness, bowel/bladder dysfunction, history of bleeding disorders.   Previous and current pain medications/doses/effects: Ibuprofen prn 1-2 x a week, Tramadol prn nighty     Previous Pain Treatments: Physical therapy once a week      Previous Pain Injections: left hip injection     Previous Diagnostic Studies/Images:   na      [FreeTextEntry7] : Left hip

## 2025-07-25 NOTE — PROCEDURE
[FreeTextEntry1] : SACROILIAC JOINT   The patient was placed in the prone position on the table with a pillow under the abdomen.  Routine monitors were applied.  The back was draped in the usual sterile fashion and sterile technique was adhered to throughout the entire procedure.   The [LEFT] [] sacroiliac joint was identified under ultrasound guidance.  . The skin and subcutaneous tissues were infiltrated with 1% Lidocaine.  After adequate local anesthesia a 3.5 inch spinal needle was inserted and introduced into the SIJ with ultrasound guidance. After negative aspiration for heme the patient was injected with a total of 20mg kenalog with 5cc 0.25% Bupivacaine.   The patient tolerated the procedure well.  There was no neurological deficit post procedure.  The patient went to recovery room in stable condition.  Discharge instructions were given to the patient.  All unused medications were wasted

## 2025-07-28 NOTE — BEGINNING OF VISIT
[0] : 2) Feeling down, depressed, or hopeless: Not at all (0) [PHQ-2 Negative] : PHQ-2 Negative [[id number]] : 0 [Never] : Never [Date Discussed (MM/DD/YY): ___] : Discussed: [unfilled] [Reviewed, no changes] : Reviewed, no changes

## 2025-07-28 NOTE — BEGINNING OF VISIT
[0] : 2) Feeling down, depressed, or hopeless: Not at all (0) [PHQ-2 Negative] : PHQ-2 Negative [[id number]] : 0 [Never] : Never [Date Discussed (MM/DD/YY): ___] : Discussed: [unfilled] [Reviewed, no changes] : Reviewed, no changes

## 2025-07-28 NOTE — BEGINNING OF VISIT
[0] : 2) Feeling down, depressed, or hopeless: Not at all (0) [PHQ-2 Negative] : PHQ-2 Negative [[id number]] : 0 [Never] : Never [Date Discussed (MM/DD/YY): ___] : Discussed: [unfilled] [Reviewed, no changes] : Reviewed, no changes

## 2025-07-28 NOTE — BEGINNING OF VISIT
[0] : 2) Feeling down, depressed, or hopeless: Not at all (0) [PHQ-2 Negative] : PHQ-2 Negative [[id number]] : 0 [Never] : Never [Date Discussed (MM/DD/YY): ___] : Discussed: [unfilled] [Reviewed, no changes] : Reviewed, no changes

## 2025-07-30 NOTE — HISTORY OF PRESENT ILLNESS
[0 - No Distress] : Distress Level: 0 [ECOG Performance Status: 0 - Fully active, able to carry on all pre-disease performance without restriction] : Performance Status: 0 - Fully active, able to carry on all pre-disease performance without restriction [de-identified] : Ms. Pacheco is a 75-year-old female with a newly diagnosed rectal mass.  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 This has resulted in regular colonoscopic treatment evaluation. She reports her last colonoscopy was approximately 3 years ago. With the exam performed November 15, 2024 a 2.5 cm low rectal mass is diagnosed and biopsied. Pt reports over the past few months a feeling of tenesmus, anorectal pelvic floor achy pain which she has needed Tylenol for management. She also reports the appearance of bright red blood per rectum with bowel movements over these past several weeks/2 to 3 months. She denies any unintended weight loss.  Pt had a repeat bx yesterday, and is reporting some scant BRBPR w BM this AM.  Pt will be seeing Dr. Plaza for rad onc consultation later today. Pt also has CT CAP scheduled for Monday.   11/15/24 Colonoscopy with Dr. Murillo.   Report: - Polyp (0.5 cm) in the cecum and appendiceal orifice. (Polypectomy).  - Soft tissue mass 2.5CM, friabile Just w/i the rectum in the distal rectum. (Biopsy).  - Grade/Stage II internal hemorrhoids.  - Normal mucosa in the ascending colon, transverse colon, descending colon, sigmoid colon and rectum compatible with microscopic colitis. (Biopsy)   Pathology:  F. RECTUM, MASS, BIOPSY  - Rectal mucosa, partially denuded with ischemic changes, focal crypt rupture, and an increase in lamina propria fibrosis, suggestive of mucosal prolapse-type change  - Significant inflammation is not seen  - No dysplasia is seen  - Deeper levels were examined       Family cancer hx:  Lung cancer- mother Colon cancer- Pt reports that son Galdino, as a peds ER MD in [place]., and would be the primary [person to make decisions if she loses capacity, but that she expects that her other son, Oliver would also be involved in shared decision-making.,   Social Hx: Alcohol use (V49.89) (Z78.9)- occasssional Never a smoker No illicit drug use Retired from employment as    Current Meds Levothyroxine Sodium 75 MCG Oral Tablet; TAKE 1 TABLET BY MOUTH EVERY DAY Mercaptopurine 50 MG Oral Tablet; TAKE 2 TABLETS BY MOUTH EVERY DAY Mupirocin 2 % External Ointment Restasis 0.05 % Ophthalmic Emulsion; 1 drop both eyes Ophthalmic Twice a day (when remembers) Triamcinolone Acetonide 0.1 % External Cream  Health screenings: PAP smear n/a (pt s/p KING/BSO) Mammogram     [de-identified] : 7/28/25 Reports left hip pain, had steroid injection 7/25  Normal BMs.  No other complaints.

## 2025-07-30 NOTE — HISTORY OF PRESENT ILLNESS
[0 - No Distress] : Distress Level: 0 [ECOG Performance Status: 0 - Fully active, able to carry on all pre-disease performance without restriction] : Performance Status: 0 - Fully active, able to carry on all pre-disease performance without restriction [de-identified] : Ms. Pacheco is a 75-year-old female with a newly diagnosed rectal mass.  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 This has resulted in regular colonoscopic treatment evaluation. She reports her last colonoscopy was approximately 3 years ago. With the exam performed November 15, 2024 a 2.5 cm low rectal mass is diagnosed and biopsied. Pt reports over the past few months a feeling of tenesmus, anorectal pelvic floor achy pain which she has needed Tylenol for management. She also reports the appearance of bright red blood per rectum with bowel movements over these past several weeks/2 to 3 months. She denies any unintended weight loss.  Pt had a repeat bx yesterday, and is reporting some scant BRBPR w BM this AM.  Pt will be seeing Dr. Plaza for rad onc consultation later today. Pt also has CT CAP scheduled for Monday.   11/15/24 Colonoscopy with Dr. Murillo.   Report: - Polyp (0.5 cm) in the cecum and appendiceal orifice. (Polypectomy).  - Soft tissue mass 2.5CM, friabile Just w/i the rectum in the distal rectum. (Biopsy).  - Grade/Stage II internal hemorrhoids.  - Normal mucosa in the ascending colon, transverse colon, descending colon, sigmoid colon and rectum compatible with microscopic colitis. (Biopsy)   Pathology:  F. RECTUM, MASS, BIOPSY  - Rectal mucosa, partially denuded with ischemic changes, focal crypt rupture, and an increase in lamina propria fibrosis, suggestive of mucosal prolapse-type change  - Significant inflammation is not seen  - No dysplasia is seen  - Deeper levels were examined       Family cancer hx:  Lung cancer- mother Colon cancer- Pt reports that son Galdino, as a peds ER MD in [place]., and would be the primary [person to make decisions if she loses capacity, but that she expects that her other son, Oliver would also be involved in shared decision-making.,   Social Hx: Alcohol use (V49.89) (Z78.9)- occasssional Never a smoker No illicit drug use Retired from employment as    Current Meds Levothyroxine Sodium 75 MCG Oral Tablet; TAKE 1 TABLET BY MOUTH EVERY DAY Mercaptopurine 50 MG Oral Tablet; TAKE 2 TABLETS BY MOUTH EVERY DAY Mupirocin 2 % External Ointment Restasis 0.05 % Ophthalmic Emulsion; 1 drop both eyes Ophthalmic Twice a day (when remembers) Triamcinolone Acetonide 0.1 % External Cream  Health screenings: PAP smear n/a (pt s/p KING/BSO) Mammogram     [de-identified] : 7/28/25 Reports left hip pain, had steroid injection 7/25  Normal BMs.  No other complaints.

## 2025-07-30 NOTE — HISTORY OF PRESENT ILLNESS
[0 - No Distress] : Distress Level: 0 [ECOG Performance Status: 0 - Fully active, able to carry on all pre-disease performance without restriction] : Performance Status: 0 - Fully active, able to carry on all pre-disease performance without restriction [de-identified] : Ms. Pacheco is a 75-year-old female with a newly diagnosed rectal mass.  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 This has resulted in regular colonoscopic treatment evaluation. She reports her last colonoscopy was approximately 3 years ago. With the exam performed November 15, 2024 a 2.5 cm low rectal mass is diagnosed and biopsied. Pt reports over the past few months a feeling of tenesmus, anorectal pelvic floor achy pain which she has needed Tylenol for management. She also reports the appearance of bright red blood per rectum with bowel movements over these past several weeks/2 to 3 months. She denies any unintended weight loss.  Pt had a repeat bx yesterday, and is reporting some scant BRBPR w BM this AM.  Pt will be seeing Dr. Plaza for rad onc consultation later today. Pt also has CT CAP scheduled for Monday.   11/15/24 Colonoscopy with Dr. Murillo.   Report: - Polyp (0.5 cm) in the cecum and appendiceal orifice. (Polypectomy).  - Soft tissue mass 2.5CM, friabile Just w/i the rectum in the distal rectum. (Biopsy).  - Grade/Stage II internal hemorrhoids.  - Normal mucosa in the ascending colon, transverse colon, descending colon, sigmoid colon and rectum compatible with microscopic colitis. (Biopsy)   Pathology:  F. RECTUM, MASS, BIOPSY  - Rectal mucosa, partially denuded with ischemic changes, focal crypt rupture, and an increase in lamina propria fibrosis, suggestive of mucosal prolapse-type change  - Significant inflammation is not seen  - No dysplasia is seen  - Deeper levels were examined       Family cancer hx:  Lung cancer- mother Colon cancer- Pt reports that son Galdino, as a peds ER MD in [place]., and would be the primary [person to make decisions if she loses capacity, but that she expects that her other son, Oliver would also be involved in shared decision-making.,   Social Hx: Alcohol use (V49.89) (Z78.9)- occasssional Never a smoker No illicit drug use Retired from employment as    Current Meds Levothyroxine Sodium 75 MCG Oral Tablet; TAKE 1 TABLET BY MOUTH EVERY DAY Mercaptopurine 50 MG Oral Tablet; TAKE 2 TABLETS BY MOUTH EVERY DAY Mupirocin 2 % External Ointment Restasis 0.05 % Ophthalmic Emulsion; 1 drop both eyes Ophthalmic Twice a day (when remembers) Triamcinolone Acetonide 0.1 % External Cream  Health screenings: PAP smear n/a (pt s/p KING/BSO) Mammogram     [de-identified] : 7/28/25 Reports left hip pain, had steroid injection 7/25  Normal BMs.  No other complaints.

## 2025-07-30 NOTE — ASSESSMENT
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 No EMVI. 3.9 cm CT C/A/P scheduled for 12/2- no metastatic disease. Small indeterminate liver lesion. will continue to monitor Case presented in [facility] Discussed utility of [employer]- ctDNA which is a useful tool for monitoring. Signed up for [employer] today  s/p CHEMO RT with Mitomycin/Xeloda- completed 2/5/2025 Now under surveillance  MRI 5/29/25: Near complete response, possible residual tumor. Advise correlation with direct visualization/tissue sampling Anoscopy 7/2025: no evidence of disease  Plan >CBC, CMP, CEA >ctDNA every 3 months >due for repeat MRI- scheduled within next 2 weeks >follow up with Rad Onc 8/19 >repeat colonoscopy Nov/December  RTC in 3 months

## 2025-07-30 NOTE — HISTORY OF PRESENT ILLNESS
[0 - No Distress] : Distress Level: 0 [ECOG Performance Status: 0 - Fully active, able to carry on all pre-disease performance without restriction] : Performance Status: 0 - Fully active, able to carry on all pre-disease performance without restriction [de-identified] : Ms. Pacheco is a 75-year-old female with a newly diagnosed rectal mass.  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 This has resulted in regular colonoscopic treatment evaluation. She reports her last colonoscopy was approximately 3 years ago. With the exam performed November 15, 2024 a 2.5 cm low rectal mass is diagnosed and biopsied. Pt reports over the past few months a feeling of tenesmus, anorectal pelvic floor achy pain which she has needed Tylenol for management. She also reports the appearance of bright red blood per rectum with bowel movements over these past several weeks/2 to 3 months. She denies any unintended weight loss.  Pt had a repeat bx yesterday, and is reporting some scant BRBPR w BM this AM.  Pt will be seeing Dr. Plaza for rad onc consultation later today. Pt also has CT CAP scheduled for Monday.   11/15/24 Colonoscopy with Dr. Murillo.   Report: - Polyp (0.5 cm) in the cecum and appendiceal orifice. (Polypectomy).  - Soft tissue mass 2.5CM, friabile Just w/i the rectum in the distal rectum. (Biopsy).  - Grade/Stage II internal hemorrhoids.  - Normal mucosa in the ascending colon, transverse colon, descending colon, sigmoid colon and rectum compatible with microscopic colitis. (Biopsy)   Pathology:  F. RECTUM, MASS, BIOPSY  - Rectal mucosa, partially denuded with ischemic changes, focal crypt rupture, and an increase in lamina propria fibrosis, suggestive of mucosal prolapse-type change  - Significant inflammation is not seen  - No dysplasia is seen  - Deeper levels were examined       Family cancer hx:  Lung cancer- mother Colon cancer- Pt reports that son Galdino, as a peds ER MD in [place]., and would be the primary [person to make decisions if she loses capacity, but that she expects that her other son, Oliver would also be involved in shared decision-making.,   Social Hx: Alcohol use (V49.89) (Z78.9)- occasssional Never a smoker No illicit drug use Retired from employment as    Current Meds Levothyroxine Sodium 75 MCG Oral Tablet; TAKE 1 TABLET BY MOUTH EVERY DAY Mercaptopurine 50 MG Oral Tablet; TAKE 2 TABLETS BY MOUTH EVERY DAY Mupirocin 2 % External Ointment Restasis 0.05 % Ophthalmic Emulsion; 1 drop both eyes Ophthalmic Twice a day (when remembers) Triamcinolone Acetonide 0.1 % External Cream  Health screenings: PAP smear n/a (pt s/p KING/BSO) Mammogram     [de-identified] : 7/28/25 Reports left hip pain, had steroid injection 7/25  Normal BMs.  No other complaints.

## 2025-07-30 NOTE — REVIEW OF SYSTEMS
[Shortness Of Breath] : shortness of breath [Diarrhea: Grade 0] : Diarrhea: Grade 0 [Negative] : Allergic/Immunologic [Patient Intake Form Reviewed] : Patient intake form was reviewed [FreeTextEntry7] : Minor pain with bowel movements [FreeTextEntry9] : L hip pain